# Patient Record
Sex: MALE | Race: ASIAN
[De-identification: names, ages, dates, MRNs, and addresses within clinical notes are randomized per-mention and may not be internally consistent; named-entity substitution may affect disease eponyms.]

---

## 2017-04-24 ENCOUNTER — RECORD ABSTRACTING (OUTPATIENT)
Age: 67
End: 2017-04-24

## 2017-04-24 DIAGNOSIS — Z83.3 FAMILY HISTORY OF DIABETES MELLITUS: ICD-10-CM

## 2017-04-24 DIAGNOSIS — Z86.79 PERSONAL HISTORY OF OTHER DISEASES OF THE CIRCULATORY SYSTEM: ICD-10-CM

## 2017-04-24 DIAGNOSIS — R39.198 OTHER DIFFICULTIES WITH MICTURITION: ICD-10-CM

## 2017-04-24 DIAGNOSIS — Z82.49 FAMILY HISTORY OF ISCHEMIC HEART DISEASE AND OTHER DISEASES OF THE CIRCULATORY SYSTEM: ICD-10-CM

## 2017-04-24 DIAGNOSIS — Z86.39 PERSONAL HISTORY OF OTHER ENDOCRINE, NUTRITIONAL AND METABOLIC DISEASE: ICD-10-CM

## 2017-04-24 PROBLEM — Z00.00 ENCOUNTER FOR PREVENTIVE HEALTH EXAMINATION: Status: ACTIVE | Noted: 2017-04-24

## 2017-06-06 ENCOUNTER — RESULT REVIEW (OUTPATIENT)
Age: 67
End: 2017-06-06

## 2017-06-06 ENCOUNTER — APPOINTMENT (OUTPATIENT)
Dept: UROLOGY | Facility: CLINIC | Age: 67
End: 2017-06-06
Payer: MEDICARE

## 2017-06-06 VITALS — SYSTOLIC BLOOD PRESSURE: 162 MMHG | DIASTOLIC BLOOD PRESSURE: 83 MMHG | HEART RATE: 72 BPM

## 2017-06-06 LAB
BILIRUB UR QL STRIP: NORMAL
CLARITY UR: CLEAR
COLLECTION METHOD: NORMAL
GLUCOSE UR-MCNC: NORMAL
HCG UR QL: NORMAL EU/DL
HGB UR QL STRIP.AUTO: NORMAL
KETONES UR-MCNC: NORMAL
LEUKOCYTE ESTERASE UR QL STRIP: NORMAL
NITRITE UR QL STRIP: NORMAL
PH UR STRIP: 7
PROT UR STRIP-MCNC: NORMAL
SP GR UR STRIP: 1

## 2017-06-06 PROCEDURE — 81003 URINALYSIS AUTO W/O SCOPE: CPT | Mod: QW

## 2017-06-06 PROCEDURE — 99213 OFFICE O/P EST LOW 20 MIN: CPT

## 2017-06-06 RX ORDER — SILODOSIN 8 MG/1
8 CAPSULE ORAL
Refills: 0 | Status: ACTIVE | COMMUNITY

## 2017-06-06 RX ORDER — SILODOSIN 8 MG/1
8 CAPSULE ORAL
Qty: 90 | Refills: 3 | Status: ACTIVE | COMMUNITY
Start: 2017-06-06 | End: 1900-01-01

## 2017-06-20 ENCOUNTER — APPOINTMENT (OUTPATIENT)
Dept: UROLOGY | Facility: CLINIC | Age: 67
End: 2017-06-20
Payer: MEDICARE

## 2017-06-20 ENCOUNTER — OUTPATIENT (OUTPATIENT)
Dept: OUTPATIENT SERVICES | Facility: HOSPITAL | Age: 67
LOS: 1 days | Discharge: HOME | End: 2017-06-20

## 2017-06-20 PROCEDURE — 55700: CPT

## 2017-06-20 PROCEDURE — 76872 US TRANSRECTAL: CPT

## 2017-06-20 PROCEDURE — 76942 ECHO GUIDE FOR BIOPSY: CPT | Mod: 59

## 2017-06-28 DIAGNOSIS — R89.7 ABNORMAL HISTOLOGICAL FINDINGS IN SPECIMENS FROM OTHER ORGANS, SYSTEMS AND TISSUES: ICD-10-CM

## 2017-07-13 ENCOUNTER — APPOINTMENT (OUTPATIENT)
Dept: UROLOGY | Facility: CLINIC | Age: 67
End: 2017-07-13

## 2017-07-13 ENCOUNTER — OTHER (OUTPATIENT)
Age: 67
End: 2017-07-13

## 2017-07-13 VITALS
SYSTOLIC BLOOD PRESSURE: 137 MMHG | BODY MASS INDEX: 23.05 KG/M2 | DIASTOLIC BLOOD PRESSURE: 69 MMHG | HEART RATE: 94 BPM | WEIGHT: 135 LBS | HEIGHT: 64 IN

## 2017-08-24 ENCOUNTER — APPOINTMENT (OUTPATIENT)
Dept: UROLOGY | Facility: CLINIC | Age: 67
End: 2017-08-24
Payer: MEDICARE

## 2017-08-24 VITALS
BODY MASS INDEX: 23.05 KG/M2 | HEIGHT: 64 IN | HEART RATE: 80 BPM | DIASTOLIC BLOOD PRESSURE: 68 MMHG | WEIGHT: 135 LBS | SYSTOLIC BLOOD PRESSURE: 131 MMHG

## 2017-08-24 PROCEDURE — 99214 OFFICE O/P EST MOD 30 MIN: CPT

## 2017-10-26 ENCOUNTER — APPOINTMENT (OUTPATIENT)
Dept: UROLOGY | Facility: CLINIC | Age: 67
End: 2017-10-26
Payer: MEDICARE

## 2017-10-26 VITALS
HEIGHT: 64 IN | SYSTOLIC BLOOD PRESSURE: 141 MMHG | WEIGHT: 140 LBS | DIASTOLIC BLOOD PRESSURE: 80 MMHG | HEART RATE: 89 BPM | BODY MASS INDEX: 23.9 KG/M2

## 2017-10-26 PROCEDURE — 99213 OFFICE O/P EST LOW 20 MIN: CPT

## 2017-10-27 ENCOUNTER — TRANSCRIPTION ENCOUNTER (OUTPATIENT)
Age: 67
End: 2017-10-27

## 2018-01-25 ENCOUNTER — APPOINTMENT (OUTPATIENT)
Dept: UROLOGY | Facility: CLINIC | Age: 68
End: 2018-01-25
Payer: MEDICARE

## 2018-01-25 VITALS
BODY MASS INDEX: 23.9 KG/M2 | WEIGHT: 140 LBS | DIASTOLIC BLOOD PRESSURE: 87 MMHG | SYSTOLIC BLOOD PRESSURE: 157 MMHG | HEIGHT: 64 IN | HEART RATE: 92 BPM

## 2018-01-25 DIAGNOSIS — R97.20 ELEVATED PROSTATE, SPECIFIC ANTIGEN [PSA]: ICD-10-CM

## 2018-01-25 PROCEDURE — 99214 OFFICE O/P EST MOD 30 MIN: CPT | Mod: 25

## 2018-01-25 PROCEDURE — 96402 CHEMO HORMON ANTINEOPL SQ/IM: CPT

## 2018-03-07 ENCOUNTER — APPOINTMENT (OUTPATIENT)
Dept: UROLOGY | Facility: CLINIC | Age: 68
End: 2018-03-07
Payer: MEDICARE

## 2018-03-07 VITALS
HEIGHT: 64 IN | SYSTOLIC BLOOD PRESSURE: 143 MMHG | WEIGHT: 140 LBS | DIASTOLIC BLOOD PRESSURE: 74 MMHG | BODY MASS INDEX: 23.9 KG/M2 | HEART RATE: 88 BPM

## 2018-03-07 PROCEDURE — 99213 OFFICE O/P EST LOW 20 MIN: CPT

## 2018-03-20 ENCOUNTER — MESSAGE (OUTPATIENT)
Age: 68
End: 2018-03-20

## 2018-03-27 ENCOUNTER — MESSAGE (OUTPATIENT)
Age: 68
End: 2018-03-27

## 2018-05-15 ENCOUNTER — APPOINTMENT (OUTPATIENT)
Dept: UROLOGY | Facility: CLINIC | Age: 68
End: 2018-05-15
Payer: MEDICARE

## 2018-05-15 PROCEDURE — 76872 US TRANSRECTAL: CPT

## 2018-06-12 ENCOUNTER — APPOINTMENT (OUTPATIENT)
Dept: UROLOGY | Facility: CLINIC | Age: 68
End: 2018-06-12
Payer: MEDICARE

## 2018-06-12 VITALS
BODY MASS INDEX: 22.88 KG/M2 | HEIGHT: 64 IN | WEIGHT: 134 LBS | HEART RATE: 81 BPM | SYSTOLIC BLOOD PRESSURE: 120 MMHG | DIASTOLIC BLOOD PRESSURE: 65 MMHG

## 2018-06-12 PROCEDURE — 99214 OFFICE O/P EST MOD 30 MIN: CPT

## 2018-06-12 RX ORDER — AMLODIPINE BESYLATE 2.5 MG/1
2.5 TABLET ORAL
Qty: 90 | Refills: 0 | Status: ACTIVE | COMMUNITY
Start: 2018-02-26

## 2018-08-14 ENCOUNTER — RX RENEWAL (OUTPATIENT)
Age: 68
End: 2018-08-14

## 2018-08-14 RX ORDER — SILODOSIN 8 MG/1
8 CAPSULE ORAL
Qty: 90 | Refills: 3 | Status: ACTIVE | COMMUNITY
Start: 2018-08-14 | End: 1900-01-01

## 2019-03-13 ENCOUNTER — APPOINTMENT (OUTPATIENT)
Dept: UROLOGY | Facility: CLINIC | Age: 69
End: 2019-03-13
Payer: MEDICARE

## 2019-03-13 PROCEDURE — 99213 OFFICE O/P EST LOW 20 MIN: CPT

## 2019-03-13 RX ORDER — CHLORHEXIDINE GLUCONATE, 0.12% ORAL RINSE 1.2 MG/ML
0.12 SOLUTION DENTAL
Qty: 946 | Refills: 0 | Status: ACTIVE | COMMUNITY
Start: 2019-02-14

## 2019-06-12 ENCOUNTER — TRANSCRIPTION ENCOUNTER (OUTPATIENT)
Age: 69
End: 2019-06-12

## 2019-06-13 ENCOUNTER — APPOINTMENT (OUTPATIENT)
Dept: UROLOGY | Facility: CLINIC | Age: 69
End: 2019-06-13
Payer: MEDICARE

## 2019-06-13 VITALS
HEIGHT: 64 IN | DIASTOLIC BLOOD PRESSURE: 72 MMHG | WEIGHT: 135 LBS | BODY MASS INDEX: 23.05 KG/M2 | SYSTOLIC BLOOD PRESSURE: 143 MMHG

## 2019-06-13 PROCEDURE — 99213 OFFICE O/P EST LOW 20 MIN: CPT

## 2019-08-08 ENCOUNTER — RX RENEWAL (OUTPATIENT)
Age: 69
End: 2019-08-08

## 2019-08-08 RX ORDER — SILODOSIN 8 MG/1
8 CAPSULE ORAL
Qty: 90 | Refills: 3 | Status: ACTIVE | COMMUNITY
Start: 2019-08-08 | End: 1900-01-01

## 2019-10-17 ENCOUNTER — APPOINTMENT (OUTPATIENT)
Dept: UROLOGY | Facility: CLINIC | Age: 69
End: 2019-10-17
Payer: MEDICARE

## 2019-10-17 VITALS
HEART RATE: 78 BPM | WEIGHT: 135 LBS | DIASTOLIC BLOOD PRESSURE: 61 MMHG | BODY MASS INDEX: 23.05 KG/M2 | HEIGHT: 64 IN | SYSTOLIC BLOOD PRESSURE: 135 MMHG

## 2019-10-17 PROCEDURE — 99213 OFFICE O/P EST LOW 20 MIN: CPT

## 2019-10-17 NOTE — HISTORY OF PRESENT ILLNESS
[FreeTextEntry1] : 68-year-old male with history of prostate carcinoma, stage T1c , group 1, rerclassified with multiple sites following prostate biopsy on active surveillance program.\par Patient is post proton therapy in September 2018 undergoing 44 treatments in 2 months. He denies any significant voiding symptoms .\par 11/2018 PSA=2.4\par 3/2019   PSA=2.0\par 6/2019  PSA = 1.6\par 10/2019 psa = 1.8\par On rapaflow Q OD [Urinary Incontinence] : no urinary incontinence [Urinary Retention] : no urinary retention [Urinary Frequency] : urinary frequency [Urinary Urgency] : no urinary urgency [Straining] : no straining [Nocturia] : nocturia [Weak Stream] : no weak stream [Dysuria] : no dysuria [Hematuria - Gross] : no gross hematuria [Fatigue] : no fatigue [Anorexia] : no anorexia [None] : None

## 2019-10-17 NOTE — ASSESSMENT
[FreeTextEntry1] : #1. Prostate carcinoma, stage T1c,  group 1--reclassification\par #2. Radioresistant prostate CA  --psa curt not yet achieved\par \par \par Plan\par PSA q.3 months\par Return 3 months\par continue rapaflo  every other day

## 2019-10-17 NOTE — PHYSICAL EXAM
[General Appearance - Well Developed] : well developed [General Appearance - Well Nourished] : well nourished [Normal Appearance] : normal appearance [Well Groomed] : well groomed [Abdomen Soft] : soft [Abdomen Tenderness] : non-tender [Costovertebral Angle Tenderness] : no ~M costovertebral angle tenderness [Skin Color & Pigmentation] : normal skin color and pigmentation [Skin Lesions] : no skin lesions [Edema] : no peripheral edema [] : no respiratory distress [Respiration, Rhythm And Depth] : normal respiratory rhythm and effort [Exaggerated Use Of Accessory Muscles For Inspiration] : no accessory muscle use [Affect] : the affect was normal [Oriented To Time, Place, And Person] : oriented to person, place, and time [Not Anxious] : not anxious [Mood] : the mood was normal [No Focal Deficits] : no focal deficits [Normal Station and Gait] : the gait and station were normal for the patient's age [Motor Exam] : the motor exam was normal [No Palpable Adenopathy] : no palpable adenopathy [FreeTextEntry1] : flat

## 2020-01-21 ENCOUNTER — APPOINTMENT (OUTPATIENT)
Dept: UROLOGY | Facility: CLINIC | Age: 70
End: 2020-01-21
Payer: MEDICARE

## 2020-01-21 PROCEDURE — 99213 OFFICE O/P EST LOW 20 MIN: CPT

## 2020-01-21 RX ORDER — AMLODIPINE BESYLATE 5 MG/1
5 TABLET ORAL
Refills: 0 | Status: DISCONTINUED | COMMUNITY
End: 2020-01-21

## 2020-09-02 ENCOUNTER — APPOINTMENT (OUTPATIENT)
Dept: UROLOGY | Facility: CLINIC | Age: 70
End: 2020-09-02
Payer: MEDICARE

## 2020-09-02 VITALS
TEMPERATURE: 97.5 F | HEIGHT: 64 IN | DIASTOLIC BLOOD PRESSURE: 67 MMHG | SYSTOLIC BLOOD PRESSURE: 117 MMHG | WEIGHT: 135 LBS | BODY MASS INDEX: 23.05 KG/M2

## 2020-09-02 PROCEDURE — 99213 OFFICE O/P EST LOW 20 MIN: CPT

## 2021-04-07 ENCOUNTER — APPOINTMENT (OUTPATIENT)
Dept: UROLOGY | Facility: CLINIC | Age: 71
End: 2021-04-07
Payer: MEDICARE

## 2021-04-07 VITALS
HEART RATE: 84 BPM | DIASTOLIC BLOOD PRESSURE: 72 MMHG | HEIGHT: 64 IN | WEIGHT: 136 LBS | SYSTOLIC BLOOD PRESSURE: 135 MMHG | BODY MASS INDEX: 23.22 KG/M2 | TEMPERATURE: 98.2 F

## 2021-04-07 PROCEDURE — 99213 OFFICE O/P EST LOW 20 MIN: CPT

## 2021-09-29 ENCOUNTER — APPOINTMENT (OUTPATIENT)
Dept: UROLOGY | Facility: CLINIC | Age: 71
End: 2021-09-29

## 2021-11-02 ENCOUNTER — APPOINTMENT (OUTPATIENT)
Dept: UROLOGY | Facility: CLINIC | Age: 71
End: 2021-11-02
Payer: MEDICARE

## 2021-11-02 VITALS — HEIGHT: 64 IN | TEMPERATURE: 98 F | BODY MASS INDEX: 22.2 KG/M2 | WEIGHT: 130 LBS

## 2021-11-02 PROCEDURE — 99213 OFFICE O/P EST LOW 20 MIN: CPT

## 2021-11-02 NOTE — PHYSICAL EXAM
[General Appearance - Well Developed] : well developed [General Appearance - Well Nourished] : well nourished [Normal Appearance] : normal appearance [Well Groomed] : well groomed [Abdomen Soft] : soft [Abdomen Tenderness] : non-tender [Costovertebral Angle Tenderness] : no ~M costovertebral angle tenderness [Skin Color & Pigmentation] : normal skin color and pigmentation [Skin Lesions] : no skin lesions [Edema] : no peripheral edema [] : no respiratory distress [Respiration, Rhythm And Depth] : normal respiratory rhythm and effort [Exaggerated Use Of Accessory Muscles For Inspiration] : no accessory muscle use [Oriented To Time, Place, And Person] : oriented to person, place, and time [Affect] : the affect was normal [Mood] : the mood was normal [Not Anxious] : not anxious [Normal Station and Gait] : the gait and station were normal for the patient's age [No Focal Deficits] : no focal deficits [Motor Exam] : the motor exam was normal [No Palpable Adenopathy] : no palpable adenopathy [FreeTextEntry1] : flat

## 2021-11-02 NOTE — HISTORY OF PRESENT ILLNESS
[Urinary Frequency] : urinary frequency [Nocturia] : nocturia [None] : None [FreeTextEntry1] : 70 -year-old male with history of prostate carcinoma, stage T1c , group 1, rerclassified with multiple sites following prostate biopsy on active surveillance program.\par Patient is post proton therapy in September 2018 undergoing 44 treatments in 2 months. He denies any significant voiding symptoms . \par Pt reports voiding well, good appetite, and good activity. Denies gross hematuria and produces clear urine.\par \par All Past and Present Data Reviewed:\par 11/2018 PSA=2.4\par 3/2019   PSA=2.0\par 6/2019  PSA = 1.6\par 10/2019 PSA = 1.8\par 1/2020 PSA  =  1.3\par 8/2020 PSA  = 0.9\par 3/2021 PSA  = 0.9\par 11/2021 psa = 0.7 [Urinary Incontinence] : no urinary incontinence [Urinary Retention] : no urinary retention [Urinary Urgency] : no urinary urgency [Straining] : no straining [Weak Stream] : no weak stream [Dysuria] : no dysuria [Hematuria - Gross] : no gross hematuria [Anorexia] : no anorexia

## 2021-11-02 NOTE — ASSESSMENT
[FreeTextEntry1] : #1. Prostate carcinoma, stage T1c,  GG  1--reclassification\par #2. Slow   response   - s/p Proton Tx .\par \par \par Plan:\par PSA 6  months  --- as per pt\par Return 6  months\par

## 2022-06-14 ENCOUNTER — APPOINTMENT (OUTPATIENT)
Dept: UROLOGY | Facility: CLINIC | Age: 72
End: 2022-06-14
Payer: MEDICARE

## 2022-06-14 DIAGNOSIS — R39.15 URGENCY OF URINATION: ICD-10-CM

## 2022-06-14 PROCEDURE — 99213 OFFICE O/P EST LOW 20 MIN: CPT

## 2022-06-14 NOTE — HISTORY OF PRESENT ILLNESS
[Urinary Frequency] : urinary frequency [Nocturia] : nocturia [None] : None [FreeTextEntry1] : 71  -year-old male with history of prostate carcinoma, stage T1c , group 1, reclassified with multiple sites following prostate biopsy on active surveillance program.\par Patient is post proton therapy in September 2018 undergoing 44 treatments in 2 months. \par Pt reports new urgency , good appetite, and good activity. Denies gross hematuria and produces clear urine.\par \par All Past and Present Data Reviewed:\par 11/2018 PSA=2.4\par 3/2019   PSA=2.0\par 6/2019  PSA = 1.6\par 10/2019 PSA = 1.8\par 1/2020 PSA  =  1.3\par 8/2020 PSA  = 0.9\par 3/2021 PSA  = 0.9\par 11/2021 psa = 0.7\par 6/2022  psa = 0.9 [Urinary Urgency] : urinary urgency [Straining] : no straining [Weak Stream] : no weak stream [Dysuria] : no dysuria [Hematuria - Gross] : no gross hematuria

## 2022-06-14 NOTE — ASSESSMENT
[FreeTextEntry1] : #1. Prostate carcinoma, stage T1c,  GG  1--reclassification\par #2. PSA bounce   - s/p Proton Tx .\par #3. LUTS -- urgency -- \par \par Plan:\par PSA 6  months  --- as per pt\par Return 6  months\par Discussed psa bounce  \par

## 2022-12-06 ENCOUNTER — APPOINTMENT (OUTPATIENT)
Dept: UROLOGY | Facility: CLINIC | Age: 72
End: 2022-12-06

## 2022-12-07 ENCOUNTER — APPOINTMENT (OUTPATIENT)
Dept: UROLOGY | Facility: CLINIC | Age: 72
End: 2022-12-07

## 2022-12-07 VITALS
WEIGHT: 130 LBS | OXYGEN SATURATION: 98 % | HEIGHT: 64 IN | BODY MASS INDEX: 22.2 KG/M2 | RESPIRATION RATE: 18 BRPM | HEART RATE: 75 BPM | SYSTOLIC BLOOD PRESSURE: 138 MMHG | TEMPERATURE: 97.5 F | DIASTOLIC BLOOD PRESSURE: 64 MMHG

## 2022-12-07 DIAGNOSIS — R35.0 FREQUENCY OF MICTURITION: ICD-10-CM

## 2022-12-07 PROCEDURE — 99213 OFFICE O/P EST LOW 20 MIN: CPT

## 2024-06-12 ENCOUNTER — APPOINTMENT (OUTPATIENT)
Dept: UROLOGY | Facility: CLINIC | Age: 74
End: 2024-06-12
Payer: MEDICARE

## 2024-06-12 VITALS
HEIGHT: 64 IN | DIASTOLIC BLOOD PRESSURE: 75 MMHG | BODY MASS INDEX: 22.2 KG/M2 | TEMPERATURE: 99 F | WEIGHT: 130 LBS | HEART RATE: 77 BPM | SYSTOLIC BLOOD PRESSURE: 162 MMHG | OXYGEN SATURATION: 97 %

## 2024-06-12 DIAGNOSIS — C61 MALIGNANT NEOPLASM OF PROSTATE: ICD-10-CM

## 2024-06-12 PROCEDURE — 99213 OFFICE O/P EST LOW 20 MIN: CPT

## 2024-06-12 PROCEDURE — G2211 COMPLEX E/M VISIT ADD ON: CPT

## 2024-10-18 ENCOUNTER — APPOINTMENT (OUTPATIENT)
Dept: VASCULAR SURGERY | Facility: CLINIC | Age: 74
End: 2024-10-18
Payer: MEDICARE

## 2024-10-18 VITALS
SYSTOLIC BLOOD PRESSURE: 169 MMHG | BODY MASS INDEX: 23.9 KG/M2 | DIASTOLIC BLOOD PRESSURE: 71 MMHG | HEART RATE: 83 BPM | HEIGHT: 64 IN | WEIGHT: 140 LBS

## 2024-10-18 DIAGNOSIS — I10 ESSENTIAL (PRIMARY) HYPERTENSION: ICD-10-CM

## 2024-10-18 DIAGNOSIS — E78.00 PURE HYPERCHOLESTEROLEMIA, UNSPECIFIED: ICD-10-CM

## 2024-10-18 DIAGNOSIS — I65.23 OCCLUSION AND STENOSIS OF BILATERAL CAROTID ARTERIES: ICD-10-CM

## 2024-10-18 PROCEDURE — 99203 OFFICE O/P NEW LOW 30 MIN: CPT

## 2024-10-18 RX ORDER — ATORVASTATIN CALCIUM 80 MG/1
TABLET, FILM COATED ORAL
Refills: 0 | Status: ACTIVE | COMMUNITY

## 2024-10-18 RX ORDER — LOSARTAN POTASSIUM AND HYDROCHLOROTHIAZIDE 100; 12.5 MG/1; MG/1
TABLET, FILM COATED ORAL
Refills: 0 | Status: ACTIVE | COMMUNITY

## 2025-04-26 ENCOUNTER — INPATIENT (INPATIENT)
Facility: HOSPITAL | Age: 75
LOS: 4 days | Discharge: HOME | DRG: 322 | End: 2025-05-01
Attending: STUDENT IN AN ORGANIZED HEALTH CARE EDUCATION/TRAINING PROGRAM | Admitting: STUDENT IN AN ORGANIZED HEALTH CARE EDUCATION/TRAINING PROGRAM
Payer: MEDICARE

## 2025-04-26 VITALS
HEART RATE: 92 BPM | OXYGEN SATURATION: 98 % | WEIGHT: 139.99 LBS | DIASTOLIC BLOOD PRESSURE: 74 MMHG | TEMPERATURE: 98 F | SYSTOLIC BLOOD PRESSURE: 146 MMHG | RESPIRATION RATE: 18 BRPM

## 2025-04-26 DIAGNOSIS — R07.9 CHEST PAIN, UNSPECIFIED: ICD-10-CM

## 2025-04-26 LAB
ALBUMIN SERPL ELPH-MCNC: 4.2 G/DL — SIGNIFICANT CHANGE UP (ref 3.5–5.2)
ALP SERPL-CCNC: 37 U/L — SIGNIFICANT CHANGE UP (ref 30–115)
ALT FLD-CCNC: 17 U/L — SIGNIFICANT CHANGE UP (ref 0–41)
ANION GAP SERPL CALC-SCNC: 12 MMOL/L — SIGNIFICANT CHANGE UP (ref 7–14)
ANION GAP SERPL CALC-SCNC: 13 MMOL/L — SIGNIFICANT CHANGE UP (ref 7–14)
APPEARANCE UR: CLEAR — SIGNIFICANT CHANGE UP
AST SERPL-CCNC: 29 U/L — SIGNIFICANT CHANGE UP (ref 0–41)
BASOPHILS # BLD AUTO: 0.02 K/UL — SIGNIFICANT CHANGE UP (ref 0–0.2)
BASOPHILS NFR BLD AUTO: 0.5 % — SIGNIFICANT CHANGE UP (ref 0–1)
BILIRUB SERPL-MCNC: 0.7 MG/DL — SIGNIFICANT CHANGE UP (ref 0.2–1.2)
BILIRUB UR-MCNC: NEGATIVE — SIGNIFICANT CHANGE UP
BUN SERPL-MCNC: 11 MG/DL — SIGNIFICANT CHANGE UP (ref 10–20)
BUN SERPL-MCNC: 14 MG/DL — SIGNIFICANT CHANGE UP (ref 10–20)
CALCIUM SERPL-MCNC: 8.5 MG/DL — SIGNIFICANT CHANGE UP (ref 8.4–10.5)
CALCIUM SERPL-MCNC: 9 MG/DL — SIGNIFICANT CHANGE UP (ref 8.4–10.5)
CHLORIDE SERPL-SCNC: 96 MMOL/L — LOW (ref 98–110)
CHLORIDE SERPL-SCNC: 98 MMOL/L — SIGNIFICANT CHANGE UP (ref 98–110)
CO2 SERPL-SCNC: 19 MMOL/L — SIGNIFICANT CHANGE UP (ref 17–32)
CO2 SERPL-SCNC: 20 MMOL/L — SIGNIFICANT CHANGE UP (ref 17–32)
COLOR SPEC: YELLOW — SIGNIFICANT CHANGE UP
CREAT SERPL-MCNC: 0.9 MG/DL — SIGNIFICANT CHANGE UP (ref 0.7–1.5)
CREAT SERPL-MCNC: 1 MG/DL — SIGNIFICANT CHANGE UP (ref 0.7–1.5)
DIFF PNL FLD: NEGATIVE — SIGNIFICANT CHANGE UP
EGFR: 78 ML/MIN/1.73M2 — SIGNIFICANT CHANGE UP
EGFR: 78 ML/MIN/1.73M2 — SIGNIFICANT CHANGE UP
EGFR: 89 ML/MIN/1.73M2 — SIGNIFICANT CHANGE UP
EGFR: 89 ML/MIN/1.73M2 — SIGNIFICANT CHANGE UP
EOSINOPHIL # BLD AUTO: 0.04 K/UL — SIGNIFICANT CHANGE UP (ref 0–0.7)
EOSINOPHIL NFR BLD AUTO: 0.9 % — SIGNIFICANT CHANGE UP (ref 0–8)
GLUCOSE SERPL-MCNC: 102 MG/DL — HIGH (ref 70–99)
GLUCOSE SERPL-MCNC: 103 MG/DL — HIGH (ref 70–99)
GLUCOSE UR QL: NEGATIVE MG/DL — SIGNIFICANT CHANGE UP
HCT VFR BLD CALC: 39 % — LOW (ref 42–52)
HGB BLD-MCNC: 13.9 G/DL — LOW (ref 14–18)
IMM GRANULOCYTES NFR BLD AUTO: 0.2 % — SIGNIFICANT CHANGE UP (ref 0.1–0.3)
KETONES UR-MCNC: 15 MG/DL
LEUKOCYTE ESTERASE UR-ACNC: NEGATIVE — SIGNIFICANT CHANGE UP
LYMPHOCYTES # BLD AUTO: 0.72 K/UL — LOW (ref 1.2–3.4)
LYMPHOCYTES # BLD AUTO: 16.4 % — LOW (ref 20.5–51.1)
MCHC RBC-ENTMCNC: 31.7 PG — HIGH (ref 27–31)
MCHC RBC-ENTMCNC: 35.6 G/DL — SIGNIFICANT CHANGE UP (ref 32–37)
MCV RBC AUTO: 89 FL — SIGNIFICANT CHANGE UP (ref 80–94)
MONOCYTES # BLD AUTO: 0.39 K/UL — SIGNIFICANT CHANGE UP (ref 0.1–0.6)
MONOCYTES NFR BLD AUTO: 8.9 % — SIGNIFICANT CHANGE UP (ref 1.7–9.3)
NEUTROPHILS # BLD AUTO: 3.22 K/UL — SIGNIFICANT CHANGE UP (ref 1.4–6.5)
NEUTROPHILS NFR BLD AUTO: 73.1 % — SIGNIFICANT CHANGE UP (ref 42.2–75.2)
NITRITE UR-MCNC: NEGATIVE — SIGNIFICANT CHANGE UP
NRBC BLD AUTO-RTO: 0 /100 WBCS — SIGNIFICANT CHANGE UP (ref 0–0)
PH UR: 7 — SIGNIFICANT CHANGE UP (ref 5–8)
PLATELET # BLD AUTO: 188 K/UL — SIGNIFICANT CHANGE UP (ref 130–400)
PMV BLD: 9.4 FL — SIGNIFICANT CHANGE UP (ref 7.4–10.4)
POTASSIUM SERPL-MCNC: 3.9 MMOL/L — SIGNIFICANT CHANGE UP (ref 3.5–5)
POTASSIUM SERPL-MCNC: 4.2 MMOL/L — SIGNIFICANT CHANGE UP (ref 3.5–5)
POTASSIUM SERPL-SCNC: 3.9 MMOL/L — SIGNIFICANT CHANGE UP (ref 3.5–5)
POTASSIUM SERPL-SCNC: 4.2 MMOL/L — SIGNIFICANT CHANGE UP (ref 3.5–5)
PROT SERPL-MCNC: 6.4 G/DL — SIGNIFICANT CHANGE UP (ref 6–8)
PROT UR-MCNC: NEGATIVE MG/DL — SIGNIFICANT CHANGE UP
RBC # BLD: 4.38 M/UL — LOW (ref 4.7–6.1)
RBC # FLD: 12 % — SIGNIFICANT CHANGE UP (ref 11.5–14.5)
SODIUM SERPL-SCNC: 128 MMOL/L — LOW (ref 135–146)
SODIUM SERPL-SCNC: 130 MMOL/L — LOW (ref 135–146)
SP GR SPEC: 1.01 — SIGNIFICANT CHANGE UP (ref 1–1.03)
TROPONIN T, HIGH SENSITIVITY RESULT: 11 NG/L — SIGNIFICANT CHANGE UP (ref 6–21)
TROPONIN T, HIGH SENSITIVITY RESULT: 12 NG/L — SIGNIFICANT CHANGE UP (ref 6–21)
UROBILINOGEN FLD QL: 0.2 MG/DL — SIGNIFICANT CHANGE UP (ref 0.2–1)
WBC # BLD: 4.4 K/UL — LOW (ref 4.8–10.8)
WBC # FLD AUTO: 4.4 K/UL — LOW (ref 4.8–10.8)

## 2025-04-26 PROCEDURE — 83735 ASSAY OF MAGNESIUM: CPT

## 2025-04-26 PROCEDURE — 80061 LIPID PANEL: CPT

## 2025-04-26 PROCEDURE — C1753: CPT

## 2025-04-26 PROCEDURE — 93018 CV STRESS TEST I&R ONLY: CPT

## 2025-04-26 PROCEDURE — C1874: CPT

## 2025-04-26 PROCEDURE — 85027 COMPLETE CBC AUTOMATED: CPT

## 2025-04-26 PROCEDURE — 84443 ASSAY THYROID STIM HORMONE: CPT

## 2025-04-26 PROCEDURE — 93016 CV STRESS TEST SUPVJ ONLY: CPT

## 2025-04-26 PROCEDURE — 93005 ELECTROCARDIOGRAM TRACING: CPT

## 2025-04-26 PROCEDURE — 71045 X-RAY EXAM CHEST 1 VIEW: CPT | Mod: 26

## 2025-04-26 PROCEDURE — 80048 BASIC METABOLIC PNL TOTAL CA: CPT

## 2025-04-26 PROCEDURE — 78452 HT MUSCLE IMAGE SPECT MULT: CPT | Mod: 26

## 2025-04-26 PROCEDURE — C9600: CPT | Mod: LD

## 2025-04-26 PROCEDURE — 93307 TTE W/O DOPPLER COMPLETE: CPT

## 2025-04-26 PROCEDURE — C1894: CPT

## 2025-04-26 PROCEDURE — C1769: CPT

## 2025-04-26 PROCEDURE — C1887: CPT

## 2025-04-26 PROCEDURE — 99223 1ST HOSP IP/OBS HIGH 75: CPT | Mod: FS

## 2025-04-26 PROCEDURE — C1725: CPT

## 2025-04-26 PROCEDURE — 92978 ENDOLUMINL IVUS OCT C 1ST: CPT | Mod: LC

## 2025-04-26 PROCEDURE — 83036 HEMOGLOBIN GLYCOSYLATED A1C: CPT

## 2025-04-26 PROCEDURE — 93458 L HRT ARTERY/VENTRICLE ANGIO: CPT | Mod: 59

## 2025-04-26 PROCEDURE — 36415 COLL VENOUS BLD VENIPUNCTURE: CPT

## 2025-04-26 RX ORDER — ATORVASTATIN CALCIUM 80 MG/1
40 TABLET, FILM COATED ORAL AT BEDTIME
Refills: 0 | Status: DISCONTINUED | OUTPATIENT
Start: 2025-04-26 | End: 2025-04-26

## 2025-04-26 RX ORDER — LOSARTAN POTASSIUM 100 MG/1
50 TABLET, FILM COATED ORAL DAILY
Refills: 0 | Status: DISCONTINUED | OUTPATIENT
Start: 2025-04-27 | End: 2025-05-01

## 2025-04-26 RX ORDER — MELATONIN 5 MG
5 TABLET ORAL AT BEDTIME
Refills: 0 | Status: DISCONTINUED | OUTPATIENT
Start: 2025-04-26 | End: 2025-04-28

## 2025-04-26 RX ORDER — LOSARTAN POTASSIUM 100 MG/1
25 TABLET, FILM COATED ORAL DAILY
Refills: 0 | Status: DISCONTINUED | OUTPATIENT
Start: 2025-04-26 | End: 2025-04-26

## 2025-04-26 RX ORDER — ASPIRIN 325 MG
324 TABLET ORAL ONCE
Refills: 0 | Status: COMPLETED | OUTPATIENT
Start: 2025-04-26 | End: 2025-04-26

## 2025-04-26 RX ORDER — ATORVASTATIN CALCIUM 80 MG/1
10 TABLET, FILM COATED ORAL AT BEDTIME
Refills: 0 | Status: DISCONTINUED | OUTPATIENT
Start: 2025-04-26 | End: 2025-04-27

## 2025-04-26 RX ORDER — REGADENOSON 0.08 MG/ML
0.4 INJECTION, SOLUTION INTRAVENOUS ONCE
Refills: 0 | Status: DISCONTINUED | OUTPATIENT
Start: 2025-04-26 | End: 2025-04-29

## 2025-04-26 RX ORDER — LOSARTAN POTASSIUM 100 MG/1
50 TABLET, FILM COATED ORAL DAILY
Refills: 0 | Status: DISCONTINUED | OUTPATIENT
Start: 2025-04-26 | End: 2025-04-26

## 2025-04-26 RX ORDER — ICOSAPENT ETHYL 500 MG/1
2 CAPSULE ORAL
Refills: 0 | DISCHARGE

## 2025-04-26 RX ORDER — HEPARIN SODIUM 1000 [USP'U]/ML
5000 INJECTION INTRAVENOUS; SUBCUTANEOUS EVERY 12 HOURS
Refills: 0 | Status: DISCONTINUED | OUTPATIENT
Start: 2025-04-26 | End: 2025-04-27

## 2025-04-26 RX ORDER — AMLODIPINE BESYLATE 10 MG/1
2.5 TABLET ORAL ONCE
Refills: 0 | Status: COMPLETED | OUTPATIENT
Start: 2025-04-26 | End: 2025-04-26

## 2025-04-26 RX ORDER — AMLODIPINE BESYLATE 10 MG/1
5 TABLET ORAL DAILY
Refills: 0 | Status: DISCONTINUED | OUTPATIENT
Start: 2025-04-27 | End: 2025-05-01

## 2025-04-26 RX ORDER — ASPIRIN 325 MG
81 TABLET ORAL DAILY
Refills: 0 | Status: DISCONTINUED | OUTPATIENT
Start: 2025-04-27 | End: 2025-05-01

## 2025-04-26 RX ORDER — AMLODIPINE BESYLATE 10 MG/1
2.5 TABLET ORAL DAILY
Refills: 0 | Status: DISCONTINUED | OUTPATIENT
Start: 2025-04-26 | End: 2025-04-26

## 2025-04-26 RX ADMIN — HEPARIN SODIUM 5000 UNIT(S): 1000 INJECTION INTRAVENOUS; SUBCUTANEOUS at 17:32

## 2025-04-26 RX ADMIN — Medication 1000 MILLILITER(S): at 05:43

## 2025-04-26 RX ADMIN — AMLODIPINE BESYLATE 2.5 MILLIGRAM(S): 10 TABLET ORAL at 08:53

## 2025-04-26 RX ADMIN — Medication 324 MILLIGRAM(S): at 17:32

## 2025-04-26 RX ADMIN — LOSARTAN POTASSIUM 50 MILLIGRAM(S): 100 TABLET, FILM COATED ORAL at 08:53

## 2025-04-26 RX ADMIN — Medication 5 MILLIGRAM(S): at 21:54

## 2025-04-26 RX ADMIN — ATORVASTATIN CALCIUM 10 MILLIGRAM(S): 80 TABLET, FILM COATED ORAL at 21:54

## 2025-04-26 RX ADMIN — AMLODIPINE BESYLATE 2.5 MILLIGRAM(S): 10 TABLET ORAL at 17:31

## 2025-04-26 NOTE — H&P ADULT - NSICDXPASTMEDICALHX_GEN_ALL_CORE_FT
PAST MEDICAL HISTORY:  HLD (hyperlipidemia)     HTN (hypertension)     Pre-diabetes     Prostate CA

## 2025-04-26 NOTE — H&P ADULT - NSICDXFAMILYHX_GEN_ALL_CORE_FT
FAMILY HISTORY:  Mother  Still living? Unknown  Family history of CVA, Age at diagnosis: Age Unknown  FH: myocardial infarction, Age at diagnosis: 71-80

## 2025-04-26 NOTE — ED PROVIDER NOTE - CLINICAL SUMMARY MEDICAL DECISION MAKING FREE TEXT BOX
Pt presented with chest pain/headache.   Labs reviewed  Patient will be placed into obs for further care.    EKG interpretation:  NSR, ischemic changes as interpreted by Bertrand Claire DO    Independent interpretation of the Xray film(s) performed by: Bertrand Claire.     Interpretation: Chest XR negative - no acute infiltrate, no pneumothorax

## 2025-04-26 NOTE — ED PROVIDER NOTE - PROGRESS NOTE DETAILS
ARASH (preattending note)     74yoM PMHx HTN, HLD, with 10 days of difficulty sleeping, 2 days waking up with headache and chest pressure. No current chest pain, no prior cardiac workup, doesn't have a cardiologist.  Discussion via Language Line  Service #[id number]      +systolic murmur on exam, RRR, no lower extremity swelling,     EKG: NSR at 78bpm, nonspecific ischemic changes, no prior for comparison    Low suspicion for ACS, given abnormal EKG and lack of prior cardiac testing,  plan for labs and obs vs admission for further cardiac testing

## 2025-04-26 NOTE — H&P ADULT - NSHPLABSRESULTS_GEN_ALL_CORE
ECG 25: NSR @ 78 bpm    Labs:                          13.9   4.40  )-----------( 188      ( 2025 04:39 )             39.0       Auto Immature Granulocyte %: 0.2 % (25 @ 04:39)        130[L]  |  98  |  11  ----------------------------<  103[H]  3.9   |  19  |  0.9      Calcium: 8.5 mg/dL (25 @ 08:22)      LFTs:             6.4  | 0.7  | 29       ------------------[37      ( 2025 04:39 )  4.2  | x    | 17              Urinalysis Basic - ( 2025 09:01 )    Color: Yellow / Appearance: Clear / S.007 / pH: x  Gluc: x / Ketone: 15 mg/dL  / Bili: Negative / Urobili: 0.2 mg/dL   Blood: x / Protein: Negative mg/dL / Nitrite: Negative   Leuk Esterase: Negative / RBC: x / WBC x   Sq Epi: x / Non Sq Epi: x / Bacteria: x    Urinalysis with Rflx Culture (collected 2025 09:01)

## 2025-04-26 NOTE — H&P ADULT - HISTORY OF PRESENT ILLNESS
73 y/o Mandarin speaking M, but able to communicate in English, with PMHx of HTN, HLD, pre-diabetes, h/o prostate Ca -> s/p radiation 2018, who presented to ED with inability to sleep few days, and the past couple of days c/o chest pressure at night, stating that "insomnia causing my chest pain."  Pt took Ambien without much effect.   Denies any SOB, dizziness, n/v, diaphoresis, orthopnea, PND, LE edema, palpitations, syncope.  In ED, troponin negative x 2.  Pt underwent a NST on 4/26/25 which revealed moderate-to-large size reversible defect in the inferolateral wall of the left ventricle consistent with ischemia, EF 70%. Pt is being admitted to [facility] for further management.

## 2025-04-26 NOTE — ED PROVIDER NOTE - OBJECTIVE STATEMENT
74 yold male to Ed mandarin speaking via interpretor Pmhx Htn, Hld,  c/o insomnia x 10 days; initally attempted otc meds without relief; then attempted ambien 10mg and slept <1 hr; pt currently on losartan, amlodipine and atorvastatin x 6 months - no new meds started; eating and drinking without issues; denies drinking excessive amount of water;  pt now c/o headache and chest pain described as pressure x 2 days; pt denies prior cardiac workup - denies sob, diaphoresis, radiation of pain;

## 2025-04-26 NOTE — PATIENT PROFILE ADULT - HISTORY OF COVID-19 VACCINATION
----- Message from Andre Mcdonald sent at 6/18/2020 10:44 AM CDT -----  Regarding: reschedule  Type:  Needs Medical Advice    Who Called: self  Reason:Patient needs to reschedule missed appointment from this morning for asap  Would the patient rather a call back or a response via [employer]? callback  Best Call Back Number: [phone]  Additional Information: none    
Called spoke to pt, she had an appointment this morning but over slept an missed her appointment. Wanted to reschedule it.  
Vaccine status unknown

## 2025-04-26 NOTE — PATIENT PROFILE ADULT - LANGUAGE ASSISTANCE NEEDED
patient able to understand and answer questions in english/No-Patient/Caregiver offered and refused free interpretation services.

## 2025-04-26 NOTE — H&P ADULT - ASSESSMENT
73 y/o Mandarin speaking M, but able to communicate in English, with PMHx of HTN, HLD, pre-diabetes, h/o prostate Ca -> s/p radiation 2018, who presented to ED with inability to sleep few days, and the past couple of days c/ochest pressure at night, trops negative x 2, with subsequent abnormal NST which revealing moderate-to-large size reversible inferolateral wall ischemia. Pt is being admitted to [facility] for further management.    #Unstable angina  -trops negative x 2  - NST with inferolateral wall ischemia  -order TTE  -ASA 325mg po x 1, then 81mg po daily  -continue home Amlodipine 2.5mg   -continue home Losartan 25mg  -continue home atorvastatin 10mg  -check lipid profile, A1C, TFTs  -plan for [facility] on Monday

## 2025-04-26 NOTE — ED PROVIDER NOTE - NS ED ROS FT
Constitutional: see hpi  Eyes: No visual changes.  ENT: No hearing changes.  Neck: No neck pain or stiffness.  Cardiovascular: No chest pain, palpitations, edema.  Pulmonary: No SOB, cough. No hemoptysis.  Abdominal:  No nausea, vomiting, diarrhea.  : No dysuria, frequency.  Neuro: No headache, syncope, dizziness.  MS: No back pain. No calf pain/swelling.  Psych: No suicidal ideations.

## 2025-04-26 NOTE — ED CDU PROVIDER INITIAL DAY NOTE - CLINICAL SUMMARY MEDICAL DECISION MAKING FREE TEXT BOX
74-year-old man, history of hypertension, hyperlipidemia was placed in CDU after he presented with insomnia for the last 10 days, feeling anxious at night, in the last few days with some mild chest pressure.  No prior cardiac workup.  On my eval, patient is very well-appearing.  I spoke at length with him using a Mandarin  and he insisted that his main issue is sleep deprivation, he does not know why he is unable to sleep but feels that he would be much better if he could.  Exam as noted.  ED workup unremarkable, troponins reassuring, EKG with no acute ischemic changes.  He was monitored in CDU, nuclear stress test was suggestive of ischemia.  Patient was admitted to telemetry for likely cardiac cath.

## 2025-04-26 NOTE — ED CDU PROVIDER DISPOSITION NOTE - WR INTERPRETATION DATE TIME  1
Aopt with hematology for follow up is made. I reached out to provider to see if I should recheck CBC. The recommended follow up lab when she is seen with them next week. Reviewed with parent s/s of bleeding.   26-Apr-2025 05:53

## 2025-04-26 NOTE — ED PROVIDER NOTE - PHYSICAL EXAMINATION
Constitutional: Well developed, well nourished. NAD  Head: Normocephalic, atraumatic.  Eyes: PERRL, EOMI.  ENT: No nasal discharge. Mucous membranes dry.  Neck: Supple. Painless ROM.  Cardiovascular:   Regular rate and rhythm.   Pulmonary:  Lungs clear to auscultation bilaterally.   Abdominal: Soft. Nondistended. No rebound, guarding, rigidity.  Extremities. Pelvis stable. No lower extremity edema, symmetric calves.  Skin: No rashes, cyanosis.  Neuro: AAOx3. No focal neurological deficits.  Psych: Normal mood. Normal affect.

## 2025-04-26 NOTE — PATIENT PROFILE ADULT - FALL HARM RISK - HARM RISK INTERVENTIONS

## 2025-04-26 NOTE — ED CDU PROVIDER INITIAL DAY NOTE - PROGRESS NOTE DETAILS
[address] [facility],2[address] ACUTE CARE PHYSICAL THERAPY EVALUATION  Estefany Munoz, 1959, OR/NONE, 11/10/2020    History  [place]:  Yes  Occupation: Full time employment  Type of occupation: [place] nurse manager    Examination of body systems (includes body structures/functions, activity/participation limitations):  · Observation:  Supine in bed upon arrival with RN attending, pt aware and conversational  · Vision:  WFL, glasses   · Hearing:  [facility]  · Patient comfortable, c/o unable to sleep at night. Also reports frequent urination at night due to unable to sleep. Sodium noted, repeat BMP sent. + chest tightness when he can't sleep. Nuc stress test ordered. Pt speaks Mandarin and English. Mandarin intrperter #[id number] provided  Patient comfortable, c/o unable to sleep at night. Also reports frequent urination at night due to unable to sleep. Sodium noted, repeat BMP sent. + chest tightness when he can't sleep. Nuc stress test ordered. Nuc stress test +. WIll Admit. Per cards tele, admit to 4t under Dr Levi Wang

## 2025-04-26 NOTE — ED PROVIDER NOTE - CARE PLAN
Principal Discharge DX:	Chest pain  Secondary Diagnosis:	Insomnia  Secondary Diagnosis:	Hyponatremia   1

## 2025-04-26 NOTE — H&P ADULT - NS ATTEND AMEND GEN_ALL_CORE FT
Patient seen and examined. Pertinent labs, imaging and telemetry reviewed. I agree with the above:     Patient feeling better. No further CP.   -Still complaining of insomnia.    74M with HTN, HLD, prediabetes, prostate CA s/p radiation presents with CP, insomnia found to have +stress with inferolateral wall ischemia.   -Continue ASA. Increase atorvastatin 10mg to 20mg PO daily.   -NPO after MN Sunday for LHC.   -Check TTE.   -Trazodone for sleep.   -Bowel regimen for constipation.

## 2025-04-27 LAB
ANION GAP SERPL CALC-SCNC: 9 MMOL/L — SIGNIFICANT CHANGE UP (ref 7–14)
BUN SERPL-MCNC: 12 MG/DL — SIGNIFICANT CHANGE UP (ref 10–20)
CALCIUM SERPL-MCNC: 9 MG/DL — SIGNIFICANT CHANGE UP (ref 8.4–10.5)
CHLORIDE SERPL-SCNC: 98 MMOL/L — SIGNIFICANT CHANGE UP (ref 98–110)
CHOLEST SERPL-MCNC: 197 MG/DL — SIGNIFICANT CHANGE UP
CO2 SERPL-SCNC: 24 MMOL/L — SIGNIFICANT CHANGE UP (ref 17–32)
CREAT SERPL-MCNC: 0.8 MG/DL — SIGNIFICANT CHANGE UP (ref 0.7–1.5)
EGFR: 92 ML/MIN/1.73M2 — SIGNIFICANT CHANGE UP
EGFR: 92 ML/MIN/1.73M2 — SIGNIFICANT CHANGE UP
GLUCOSE SERPL-MCNC: 116 MG/DL — HIGH (ref 70–99)
HCT VFR BLD CALC: 42.4 % — SIGNIFICANT CHANGE UP (ref 42–52)
HDLC SERPL-MCNC: 97 MG/DL — SIGNIFICANT CHANGE UP
HGB BLD-MCNC: 15 G/DL — SIGNIFICANT CHANGE UP (ref 14–18)
LDLC SERPL-MCNC: 84 MG/DL — SIGNIFICANT CHANGE UP
LIPID PNL WITH DIRECT LDL SERPL: 84 MG/DL — SIGNIFICANT CHANGE UP
MAGNESIUM SERPL-MCNC: 2.2 MG/DL — SIGNIFICANT CHANGE UP (ref 1.8–2.4)
MCHC RBC-ENTMCNC: 31.7 PG — HIGH (ref 27–31)
MCHC RBC-ENTMCNC: 35.4 G/DL — SIGNIFICANT CHANGE UP (ref 32–37)
MCV RBC AUTO: 89.6 FL — SIGNIFICANT CHANGE UP (ref 80–94)
NONHDLC SERPL-MCNC: 100 MG/DL — SIGNIFICANT CHANGE UP
NRBC BLD AUTO-RTO: 0 /100 WBCS — SIGNIFICANT CHANGE UP (ref 0–0)
PLATELET # BLD AUTO: 213 K/UL — SIGNIFICANT CHANGE UP (ref 130–400)
PMV BLD: 9.5 FL — SIGNIFICANT CHANGE UP (ref 7.4–10.4)
POTASSIUM SERPL-MCNC: 4.2 MMOL/L — SIGNIFICANT CHANGE UP (ref 3.5–5)
POTASSIUM SERPL-SCNC: 4.2 MMOL/L — SIGNIFICANT CHANGE UP (ref 3.5–5)
RBC # BLD: 4.73 M/UL — SIGNIFICANT CHANGE UP (ref 4.7–6.1)
RBC # FLD: 12.3 % — SIGNIFICANT CHANGE UP (ref 11.5–14.5)
SODIUM SERPL-SCNC: 131 MMOL/L — LOW (ref 135–146)
TRIGL SERPL-MCNC: 92 MG/DL — SIGNIFICANT CHANGE UP
WBC # BLD: 6.02 K/UL — SIGNIFICANT CHANGE UP (ref 4.8–10.8)
WBC # FLD AUTO: 6.02 K/UL — SIGNIFICANT CHANGE UP (ref 4.8–10.8)

## 2025-04-27 PROCEDURE — 93306 TTE W/DOPPLER COMPLETE: CPT | Mod: 26

## 2025-04-27 PROCEDURE — 99223 1ST HOSP IP/OBS HIGH 75: CPT | Mod: FS

## 2025-04-27 PROCEDURE — 93010 ELECTROCARDIOGRAM REPORT: CPT

## 2025-04-27 RX ORDER — TRAZODONE HCL 100 MG
50 TABLET ORAL AT BEDTIME
Refills: 0 | Status: DISCONTINUED | OUTPATIENT
Start: 2025-04-27 | End: 2025-04-29

## 2025-04-27 RX ORDER — SENNA 187 MG
2 TABLET ORAL AT BEDTIME
Refills: 0 | Status: DISCONTINUED | OUTPATIENT
Start: 2025-04-27 | End: 2025-05-01

## 2025-04-27 RX ORDER — POLYETHYLENE GLYCOL 3350 17 G/17G
17 POWDER, FOR SOLUTION ORAL DAILY
Refills: 0 | Status: DISCONTINUED | OUTPATIENT
Start: 2025-04-27 | End: 2025-05-01

## 2025-04-27 RX ORDER — ATORVASTATIN CALCIUM 80 MG/1
20 TABLET, FILM COATED ORAL AT BEDTIME
Refills: 0 | Status: DISCONTINUED | OUTPATIENT
Start: 2025-04-27 | End: 2025-04-28

## 2025-04-27 RX ADMIN — POLYETHYLENE GLYCOL 3350 17 GRAM(S): 17 POWDER, FOR SOLUTION ORAL at 11:04

## 2025-04-27 RX ADMIN — Medication 5 MILLIGRAM(S): at 22:12

## 2025-04-27 RX ADMIN — Medication 81 MILLIGRAM(S): at 11:05

## 2025-04-27 RX ADMIN — LOSARTAN POTASSIUM 50 MILLIGRAM(S): 100 TABLET, FILM COATED ORAL at 05:49

## 2025-04-27 RX ADMIN — Medication 2 TABLET(S): at 22:12

## 2025-04-27 RX ADMIN — ATORVASTATIN CALCIUM 20 MILLIGRAM(S): 80 TABLET, FILM COATED ORAL at 22:12

## 2025-04-27 RX ADMIN — AMLODIPINE BESYLATE 5 MILLIGRAM(S): 10 TABLET ORAL at 05:49

## 2025-04-27 RX ADMIN — Medication 50 MILLIGRAM(S): at 22:12

## 2025-04-27 RX ADMIN — HEPARIN SODIUM 5000 UNIT(S): 1000 INJECTION INTRAVENOUS; SUBCUTANEOUS at 05:49

## 2025-04-27 RX ADMIN — Medication 25 MILLIGRAM(S): at 19:42

## 2025-04-28 ENCOUNTER — TRANSCRIPTION ENCOUNTER (OUTPATIENT)
Age: 75
End: 2025-04-28

## 2025-04-28 LAB
A1C WITH ESTIMATED AVERAGE GLUCOSE RESULT: 6.1 % — HIGH (ref 4–5.6)
ANION GAP SERPL CALC-SCNC: 9 MMOL/L — SIGNIFICANT CHANGE UP (ref 7–14)
BUN SERPL-MCNC: 15 MG/DL — SIGNIFICANT CHANGE UP (ref 10–20)
CALCIUM SERPL-MCNC: 7.9 MG/DL — LOW (ref 8.4–10.5)
CHLORIDE SERPL-SCNC: 100 MMOL/L — SIGNIFICANT CHANGE UP (ref 98–110)
CO2 SERPL-SCNC: 22 MMOL/L — SIGNIFICANT CHANGE UP (ref 17–32)
CREAT SERPL-MCNC: 0.9 MG/DL — SIGNIFICANT CHANGE UP (ref 0.7–1.5)
EGFR: 89 ML/MIN/1.73M2 — SIGNIFICANT CHANGE UP
EGFR: 89 ML/MIN/1.73M2 — SIGNIFICANT CHANGE UP
ESTIMATED AVERAGE GLUCOSE: 128 MG/DL — HIGH (ref 68–114)
GLUCOSE SERPL-MCNC: 117 MG/DL — HIGH (ref 70–99)
POTASSIUM SERPL-MCNC: 3.7 MMOL/L — SIGNIFICANT CHANGE UP (ref 3.5–5)
POTASSIUM SERPL-SCNC: 3.7 MMOL/L — SIGNIFICANT CHANGE UP (ref 3.5–5)
SODIUM SERPL-SCNC: 131 MMOL/L — LOW (ref 135–146)
TSH SERPL-MCNC: 2.68 UIU/ML — SIGNIFICANT CHANGE UP (ref 0.27–4.2)

## 2025-04-28 PROCEDURE — 93458 L HRT ARTERY/VENTRICLE ANGIO: CPT | Mod: 26,XU

## 2025-04-28 PROCEDURE — 99233 SBSQ HOSP IP/OBS HIGH 50: CPT

## 2025-04-28 PROCEDURE — 92978 ENDOLUMINL IVUS OCT C 1ST: CPT | Mod: 26,LC

## 2025-04-28 PROCEDURE — 93010 ELECTROCARDIOGRAM REPORT: CPT

## 2025-04-28 PROCEDURE — 93010 ELECTROCARDIOGRAM REPORT: CPT | Mod: 77

## 2025-04-28 PROCEDURE — 92928 PRQ TCAT PLMT NTRAC ST 1 LES: CPT | Mod: LC

## 2025-04-28 RX ORDER — CARVEDILOL 3.12 MG/1
6.25 TABLET, FILM COATED ORAL EVERY 12 HOURS
Refills: 0 | Status: DISCONTINUED | OUTPATIENT
Start: 2025-04-28 | End: 2025-05-01

## 2025-04-28 RX ORDER — DIPHENHYDRAMINE HCL 12.5MG/5ML
50 ELIXIR ORAL ONCE
Refills: 0 | Status: COMPLETED | OUTPATIENT
Start: 2025-04-28 | End: 2025-04-28

## 2025-04-28 RX ORDER — CLOPIDOGREL BISULFATE 75 MG/1
600 TABLET, FILM COATED ORAL ONCE
Refills: 0 | Status: COMPLETED | OUTPATIENT
Start: 2025-04-28 | End: 2025-04-28

## 2025-04-28 RX ORDER — METOPROLOL SUCCINATE 50 MG/1
12.5 TABLET, EXTENDED RELEASE ORAL ONCE
Refills: 0 | Status: COMPLETED | OUTPATIENT
Start: 2025-04-28 | End: 2025-04-28

## 2025-04-28 RX ORDER — BISACODYL 5 MG
10 TABLET, DELAYED RELEASE (ENTERIC COATED) ORAL DAILY
Refills: 0 | Status: DISCONTINUED | OUTPATIENT
Start: 2025-04-28 | End: 2025-05-01

## 2025-04-28 RX ORDER — ATORVASTATIN CALCIUM 80 MG/1
40 TABLET, FILM COATED ORAL AT BEDTIME
Refills: 0 | Status: DISCONTINUED | OUTPATIENT
Start: 2025-04-28 | End: 2025-05-01

## 2025-04-28 RX ORDER — CLOPIDOGREL BISULFATE 75 MG/1
75 TABLET, FILM COATED ORAL DAILY
Refills: 0 | Status: DISCONTINUED | OUTPATIENT
Start: 2025-04-29 | End: 2025-05-01

## 2025-04-28 RX ORDER — MELATONIN 5 MG
10 TABLET ORAL ONCE
Refills: 0 | Status: DISCONTINUED | OUTPATIENT
Start: 2025-04-28 | End: 2025-05-01

## 2025-04-28 RX ADMIN — Medication 81 MILLIGRAM(S): at 09:33

## 2025-04-28 RX ADMIN — ATORVASTATIN CALCIUM 40 MILLIGRAM(S): 80 TABLET, FILM COATED ORAL at 21:43

## 2025-04-28 RX ADMIN — Medication 500 MILLILITER(S): at 10:39

## 2025-04-28 RX ADMIN — METOPROLOL SUCCINATE 12.5 MILLIGRAM(S): 50 TABLET, EXTENDED RELEASE ORAL at 10:32

## 2025-04-28 RX ADMIN — CLOPIDOGREL BISULFATE 600 MILLIGRAM(S): 75 TABLET, FILM COATED ORAL at 11:00

## 2025-04-28 RX ADMIN — POLYETHYLENE GLYCOL 3350 17 GRAM(S): 17 POWDER, FOR SOLUTION ORAL at 17:46

## 2025-04-28 RX ADMIN — Medication 150 MILLILITER(S): at 12:00

## 2025-04-28 RX ADMIN — LOSARTAN POTASSIUM 50 MILLIGRAM(S): 100 TABLET, FILM COATED ORAL at 06:21

## 2025-04-28 RX ADMIN — CARVEDILOL 6.25 MILLIGRAM(S): 3.12 TABLET, FILM COATED ORAL at 17:06

## 2025-04-28 RX ADMIN — AMLODIPINE BESYLATE 5 MILLIGRAM(S): 10 TABLET ORAL at 06:20

## 2025-04-28 RX ADMIN — Medication 2 TABLET(S): at 21:42

## 2025-04-28 NOTE — DISCHARGE NOTE PROVIDER - HOSPITAL COURSE
Patient is a 74y Mandarin speaking Male, but able to communicate in English with PMHx of HTN, HLD, pre-diabetes, h/o prostate Ca -> s/p radiation 2018, who presented to ED with inability to sleep few days, and the past couple of days c/o chest pressure at night, stating that "insomnia causing my chest pain." In ED, troponin negative x 2.  Pt underwent a NST on 4/26/25 which revealed moderate-to-large size reversible defect in the inferolateral wall of the left ventricle consistent with ischemia, EF 70%. On 04/28/2025 patient underwent LHC which revealed     Coronary Dominance: Right dominate  LM: 30% lesion prox LM  LAD: Diffused calcified 80% lesion mid LAD amendable to PCI  CX: 99% lesion Prox LCX s/p PCI, 90% lesion OM1 s/p PCI  RCA: 40% lesion prox RCA     LVEDP: 14 mmHg     SYNERGY 2.75 x 32mm to pLCX (AUC 8)    Patient was monitored overnight. On POD 1 patient remains HD stable with no complaints. Patient remains in SR with no arrhythmias noted on tele. EKG performed showed no acute ST changes. Examination of right radial artery showed a C/DI site with no hematoma, erythema or bruit. Distal pulses are 2+ bilaterally. Renal function remains stable post cath. Patient is scheduled for a staged PCI to LAD on 4/30. Patient will be discharged home on DAPT with ASA and Plavix. Patient is a 74y Mandarin speaking Male, but able to communicate in English with PMHx of HTN, HLD, pre-diabetes, h/o prostate Ca -> s/p radiation 2018, who presented to ED with inability to sleep few days, and the past couple of days c/o chest pressure at night, stating that "insomnia causing my chest pain." In ED, troponin negative x 2.  Pt underwent a NST on 4/26/25 which revealed moderate-to-large size reversible defect in the inferolateral wall of the left ventricle consistent with ischemia, EF 70%.  On 04/28/2025 patient underwent LHC which revealed:    Coronary Dominance: Right dominate  LM: 30% lesion prox LM  LAD: Diffused calcified 80% lesion mid LAD amendable to PCI  CX: 99% lesion Prox LCX s/p PCI, 90% lesion OM1 s/p PCI  RCA: 40% lesion prox RCA     LVEDP: 14 mmHg     SYNERGY 2.75 x 32mm to pLCX (AUC 8)    On 4/30/2025 patient underwent LHC for staged PCI to LAD:  Intervention: Successful IVUS-guided PCI of mLAD with balloon angioplasty s/p MONSERRAT x1  Implants: SYNERGY 3.0 X 38 MM  AUC: 7  FINDINGS:   Coronary Dominance: Right   LM: 30% prox LM lesion    LAD: 80% mLAD calcified lesion s/p PCI    D1: 30% ostial stenosis   CX: patent stent in pCX, moderate disease distally   OM1: patent stent in OM1   RCA: right not injected      Patient was monitored overnight. On POD 1 patient remains HD stable with no complaints. Patient remains in SR with no arrhythmias noted on tele. EKG performed showed no acute ST changes. Examination of right radial artery showed a C/DI site with no hematoma, erythema or bruit. Distal pulses are 2+ bilaterally. Renal function remains stable post cath.  Patient will be discharged home on DAPT with ASA and Plavix.   Patient is a 74y Mandarin speaking Male, but able to communicate in English with PMHx of HTN, HLD, pre-diabetes, h/o prostate Ca -> s/p radiation 2018, who presented to ED with inability to sleep few days, and the past couple of days c/o chest pressure at night, stating that "insomnia causing my chest pain." In ED, troponin negative x 2.  Pt underwent a NST on 4/26/25 which revealed moderate-to-large size reversible defect in the inferolateral wall of the left ventricle consistent with ischemia, EF 70%.  On 04/28/2025 patient underwent LHC which revealed:    Coronary Dominance: Right dominate  LM: 30% lesion prox LM  LAD: Diffused calcified 80% lesion mid LAD amendable to PCI  CX: 99% lesion Prox LCX s/p PCI, 90% lesion OM1 s/p PCI  RCA: 40% lesion prox RCA     LVEDP: 14 mmHg     SYNERGY 2.75 x 32mm to pLCX (AUC 8)    On 4/30/2025 patient underwent LHC for staged PCI to LAD:    Intervention: Successful IVUS-guided PCI of mLAD with balloon angioplasty s/p MONSERRAT x1  Implants: SYNERGY 3.0 X 38 MM  AUC: 7  FINDINGS:   Coronary Dominance: Right   LM: 30% prox LM lesion    LAD: 80% mLAD calcified lesion s/p PCI    D1: 30% ostial stenosis   CX: patent stent in pCX, moderate disease distally   OM1: patent stent in OM1   RCA: right not injected       Patient was monitored overnight. On POD 1 patient remains HD stable with no complaints. Patient remains in SR with no arrhythmias noted on tele. EKG performed showed no acute ST changes. Examination of right radial artery showed a C/DI site with no hematoma, erythema or bruit. Distal pulses are 2+ bilaterally. Renal function remains stable post cath.  Patient will be discharged home on DAPT with ASA and Plavix.

## 2025-04-28 NOTE — DISCHARGE NOTE PROVIDER - CARE PROVIDER_API CALL
Lien Torres  Cardiovascular Disease  [address] [place] [id number]  Phone: [phone]  Fax: [phone]  Scheduled Appointment: 05/12/2025 12:00 PM

## 2025-04-28 NOTE — DISCHARGE NOTE PROVIDER - NSDCCPCAREPLAN_GEN_ALL_CORE_FT
PRINCIPAL DISCHARGE DIAGNOSIS  Diagnosis: Chest pain  Assessment and Plan of Treatment:       SECONDARY DISCHARGE DIAGNOSES  Diagnosis: Insomnia  Assessment and Plan of Treatment:     Diagnosis: Hyponatremia  Assessment and Plan of Treatment:      PRINCIPAL DISCHARGE DIAGNOSIS  Diagnosis: Chest pain  Assessment and Plan of Treatment: Please take Aspirn and Plavix as prescribed  Please take Losartan. Carvedilol, and Amlodipine as prescribed  Please take Atorvastatin at prescribed  Please see Dr. Torres in 2 weeks

## 2025-04-28 NOTE — CHART NOTE - NSCHARTNOTEFT_GEN_A_CORE
PRE-OP DIAGNOSIS:  Unstable Angina      PROCEDURE:     [X] Coronary Angiogram     [X] LHC     [] LVG     [] RHC     [X] Intervention (see below)         PHYSICIAN:  Dr. Pickett    ASSISTANT:  Dr. Jamison       PROCEDURE DESCRIPTION:     Consent:      [X] Patient     [] Family Member     []  Used        Anesthesia:     [X] General     [] Sedation     [X] Local        Access & Closure:     [X] 6Fr R Radial Artery     [] Fr Femoral Artery     [] Fr Femoral Vein     [] Fr Brachial Vein       IV Contrast: 120mL        Intervention:   OCT  MONSERRAT PCI to pLcx    Implants:    SYNERGY 2.75 x 32mm to pLCX (AUC 8)    FINDINGS:     Coronary Dominance: Right dominate      LM: 30% lesion prox LM    LAD: Diffused calcified 80% lesion mid LAD amendable to PCI    CX: 99% lesion Prox LCX s/p PCI, 90% lesion OM1 s/p PCI    RCA: 40% lesion prox RCA     LVEDP: 14 mmHg     ESTIMATED BLOOD LOSS: < 10 mL        CONDITION:     [X] Good     [] Fair     [] Critical        SPECIMEN REMOVED: N/A       POST-OP DIAGNOSIS:      [] Normal Coronary Angiogram     [] Mild Coronary Artery Disease (< 50% stenosis)     [X] 2 Vessel Coronary Artery Disease        PLAN OF CARE:     [] D/C Home Today     [] Return to In-patient bed     [] Admit for observation     [X] Return for Staged Procedure 04/30 for LAD    [] CT Surgery Consult     [X] Medications: DAPT, Statin, BB    [X] IV Fluids: NS 100cc/hr x 6hrs

## 2025-04-28 NOTE — DISCHARGE NOTE PROVIDER - NSDCFUSCHEDAPPT_GEN_ALL_CORE_FT
[place]  [facility] Physician [facility]  UROLOGY [address] [facility]  Scheduled Appointment: 06/12/2025     Lien Torres  [facility]  CARDIOLOGY [address]  Scheduled Appointment: 05/12/2025    [place]  [facility]  UROLOGY [address] [facility]  Scheduled Appointment: 06/12/2025

## 2025-04-28 NOTE — CHART NOTE - NSCHARTNOTEFT_GEN_A_CORE
INTERVENTIONAL CARDIOLOGY:                                               PREOPERATIVE DAY OF PROCEDURE EVALUATION:  I have personally seen and examined the patient.  I agree with the history and physical which I have reviewed and noted any changes below.         73 y/o Mandarin speaking M, but able to communicate in English, with PMHx of HTN, HLD, pre-diabetes, h/o prostate Ca -> s/p radiation 2018, who presented to ED with inability to sleep few days, and the past couple of days c/ochest pressure at night, trops negative x 2, with subsequent abnormal NST which revealing moderate-to-large size reversible inferolateral wall ischemia. Pt is being admitted to [facility] for further management.    #Unstable angina  -trops negative x 2  - NST with inferolateral wall ischemia  -TTE read pend   -Cont Aspirin 81mg daily   - Heparin Subq on hold for cath   -continue home Amlodipine 2.5mg   -continue home Losartan 25mg  -continue home atorvastatin 20mg  -check ldl 84, A1C, TFTs  -NPO for LHC         Pre cath note:  indication:  [ ] STEMI                [ ] NSTEMI                 [ ] Acute coronary syndrome                   [ x]Unstable Angina   [ ] high risk  [ ] intermediate risk  [ ] low risk                   [ ] Stable Angina     non-invasive testing:          NST                Date:       4/26/25              result: [ ] high risk  [x ] intermediate risk  [ ] low risk    Anti- Anginal medications:                    [ ] not used d/t                     [ ] used   ( ) BB     ( ) CCB      ( ) Nitrate   (  ) Ranexa          [ ] not used but strong indication not to use    Ejection Fraction                   [ ] <29            [ ] 30-39%   [ ] 40-49%     [ ]>50%    CHF          [ ] active (within last 14 days on meds   [ ] Chronic (on meds but no exacerbation)  NYHA Functional Class:  (  ) Class I (no limitations)  (  ) Class II (slight limitation)  (  ) Class III (marked limitation)  (  ) Class IV (symptoms at rest)    COPD                   [ ] mild (on chronic bronchodilators)  [ ] moderate (on chronic steroid therapy)      [ ] severe (indication for home O2 or PACO2 >50)    Other risk factors:                     [ ] Previous MI                     [ ] CVA/ stroke                    [ ] carotid stent/ CEA                    [ ] PVD/PAD- (arterial aneurysm, non-palpable pulses, tortuous vessel with inability to insert catheter, infra-renal dissection, renal or subclavian artery stenosis)                    [ ] previous CABG                    [ ] Renal Failure:  on HD  (  ) yes  (  ) no                    [ ] Diabetic  (  ) Type 1  (  ) Type 2                                         (  ) Insulin dependent  (  ) non-insulin dependent                                         (  ) Metformin  (  ) Januvia  (  ) Glimepiride  (  ) Glipizide  (  ) Glyburide  (  ) Actos                                         (  ) GLP-1 receptor agonists (Ozempic, Wegovy, Zepbound, Mounjaro, Trulicity, Byetta, Victoza)                                         (  ) SGLT2 Inhibitors (Farxiga, Jardiance, Invokana)                                         (  ) Other                            15.0   6.02  )-----------( 213      ( 27 Apr 2025 05:48 )             42.4     04-27    131[L]  |  98  |  12  ----------------------------<  116[H]  4.2   |  24  |  0.8    Ca    9.0      27 Apr 2025 05:48  Mg     2.2     04-27        Bleeding Risk:     Pre-cath Hydration: (  )  cc IV bolus x 1 over 1 hr followed by:    (  ) NS @ 75cc/hr until procedure (up to 2 hrs) if EF> 50%                                                                                                                             (  ) NS @ 50cc/hr until procedure (up to 2 hrs) if EF< 50%                                        (  ) No precath hydration d/t      RIGHT RADIAL ARTERY EVALUATION:  REYNA TEST: [] Negative          [] Positive    EF:   Date:    EKG:   Date:       (Signed electronically by __________)  04-28-25 @ 09:37          73 y/o Mandarin speaking M, but able to communicate in English, with PMHx of HTN, HLD, pre-diabetes, h/o prostate Ca -> s/p radiation 2018, who presented to ED with inability to sleep few days, and the past couple of days c/ochest pressure at night, trops negative x 2, with subsequent abnormal NST which revealing moderate-to-large size reversible inferolateral wall ischemia. Pt is being admitted to [facility] for further management.    #Unstable angina  -trops negative x 2  - NST with inferolateral wall ischemia  -TTE read pend   -Cont Aspirin 81mg daily   - Heparin Subq on hold for cath   -continue home Amlodipine 2.5mg   -continue home Losartan 25mg  -continue home atorvastatin 20mg  -check ldl 84, A1C, TFTs  -NPO for [facility] tomorrow INTERVENTIONAL CARDIOLOGY:                                               PREOPERATIVE DAY OF PROCEDURE EVALUATION:  I have personally seen and examined the patient.  I agree with the history and physical which I have reviewed and noted any changes below.         75 y/o Mandarin speaking M, but able to communicate in English, with PMHx of HTN, HLD, pre-diabetes(not on medication), h/o prostate Ca -> s/p radiation 2018, who presented to ED with inability to sleep few days, and the past couple of days c/ochest pressure at night, trops negative x 2, with subsequent abnormal NST which revealing moderate-to-large size reversible inferolateral wall ischemia. Pt presents for [facility] with possible intervention.             Pre cath note:  indication:  [ ] STEMI                [ ] NSTEMI                 [ ] Acute coronary syndrome                   [ x]Unstable Angina   [ ] high risk  [ ] intermediate risk  [ ] low risk                   [ x] Stable Angina     non-invasive testing:          NST                Date:       4/26/25              result: [ ] high risk  [x ] intermediate risk  [ ] low risk    Anti- Anginal medications:                    [ ] not used d/t                     [x used   (x ) BB     (x ) CCB      ( ) Nitrate   (  ) Ranexa          [ ] not used but strong indication not to use    Ejection Fraction                   [ ] <29            [ ] 30-39%   [ ] 40-49%     [ x]>50%    CHF          [ ] active (within last 14 days on meds   [ ] Chronic (on meds but no exacerbation)  NYHA Functional Class:  (  ) Class I (no limitations)  (  ) Class II (slight limitation)  (  ) Class III (marked limitation)  (  ) Class IV (symptoms at rest)    COPD                   [ ] mild (on chronic bronchodilators)  [ ] moderate (on chronic steroid therapy)      [ ] severe (indication for home O2 or PACO2 >50)    Other risk factors:                     [ ] Previous MI                     [ ] CVA/ stroke                    [ ] carotid stent/ CEA                    [ ] PVD/PAD- (arterial aneurysm, non-palpable pulses, tortuous vessel with inability to insert catheter, infra-renal dissection, renal or subclavian artery stenosis)                    [ ] previous CABG                    [ ] Renal Failure:  on HD  (  ) yes  (  ) no                    [ x] Diabetic  (  ) Type 1  (x  ) Type 2                                         (  ) Insulin dependent  ( x ) non-insulin dependent- no medications                                         (  ) Metformin  (  ) Januvia  (  ) Glimepiride  (  ) Glipizide  (  ) Glyburide  (  ) Actos                                         (  ) GLP-1 receptor agonists (Ozempic, Wegovy, Zepbound, Mounjaro, Trulicity, Byetta, Victoza)                                         (  ) SGLT2 Inhibitors (Farxiga, Jardiance, Invokana)                                         (  ) Other                            15.0   6.02  )-----------( 213      ( 27 Apr 2025 05:48 )             42.4     04-27    131[L]  |  98  |  12  ----------------------------<  116[H]  4.2   |  24  |  0.8    Ca    9.0      27 Apr 2025 05:48  Mg     2.2     04-27        Bleeding Risk: 1.3%    Pre-cath Hydration: (  x)  cc IV bolus x 1 over 1 hr followed by:    ( x ) NS @ 75cc/hr until procedure (up to 2 hrs) if EF> 50%                                                                                                                             (  ) NS @ 50cc/hr until procedure (up to 2 hrs) if EF< 50%                                        (  ) No precath hydration d/t      RIGHT RADIAL ARTERY EVALUATION:  REYNA TEST: [] Negative          [] Positive    EF:   Date: < from: TTE Echo Complete w/ Contrast w/o Doppler (04.27.25 @ 09:22) >      Summary:   1. Normal global left ventricular systolic function.   2. LV Ejection Fraction by Perera's Method with a biplane EF of 62 %.   3. Spectral Doppler shows impaired relaxation pattern of left   ventricular myocardial filling (Grade I diastolic dysfunction).   4. Normal left atrial size.   5. Normal right atrial size.   6. Mild mitral valve regurgitation.   7. Mild tricuspid regurgitation.   8. Sclerotic aortic valve with normal opening.   9. Mild to moderate aortic regurgitation.  10. There is no evidence of pericardial effusion.            EKG:   Date:< from: 12 Lead ECG (04.27.25 @ 07:21) >    Diagnosis Line Normal sinus rhythm  Left ventricular hypertrophy with repolarization abnormality ( R in aVL ,   Romhilt-Norman )  Abnormal ECG               (Signed electronically by __________)  04-28-25 @ 09:37          75 y/o Mandarin speaking M, but able to communicate in English, with PMHx of HTN, HLD, pre-diabetes, h/o prostate Ca -> s/p radiation 2018, who presented to ED with inability to sleep few days, and the past couple of days c/ochest pressure at night, trops negative x 2, with subsequent abnormal NST which revealing moderate-to-large size reversible inferolateral wall ischemia. Pt is being admitted to [facility] for further management.    #Unstable angina  -trops negative x 2  - NST with inferolateral wall ischemia  -TTE read pend   -Cont Aspirin 81mg daily   - Heparin Subq on hold for cath   -continue home Amlodipine 2.5mg   -continue home Losartan 25mg  -continue home atorvastatin 20mg  -check ldl 84, A1C, TFTs  -NPO for C tomorrow

## 2025-04-28 NOTE — DISCHARGE NOTE PROVIDER - NSDCMRMEDTOKEN_GEN_ALL_CORE_FT
amLODIPine 2.5 mg oral tablet: 1 tab(s) orally once a day  atorvastatin 10 mg oral tablet: 1 tab(s) orally once a day  icosapent 1 g oral capsule: 2 cap(s) orally once a day  losartan 25 mg oral tablet: 1 tab(s) orally once a day   Adult Aspirin Regimen 81 mg oral delayed release tablet: 1 tab(s) orally once a day  amLODIPine 5 mg oral tablet: 1 tab(s) orally once a day  atorvastatin 40 mg oral tablet: 1 tab(s) orally once a day (at bedtime)  Coreg 6.25 mg oral tablet: 1 tab(s) orally every 12 hours  icosapent 1 g oral capsule: 2 cap(s) orally once a day  losartan 50 mg oral tablet: 1 tab(s) orally once a day  Plavix 75 mg oral tablet: 1 tab(s) orally once a day  zolpidem 5 mg oral tablet: 1 tab(s) orally once a day (at bedtime) As needed Insomnia

## 2025-04-28 NOTE — DISCHARGE NOTE PROVIDER - NSDCCPTREATMENT_GEN_ALL_CORE_FT
PRINCIPAL PROCEDURE  Procedure: Left heart catheterization  Findings and Treatment: - Please take aspirin 81 mg daily and Plavix 75mg daily, unless directed by your Cardiologist.  - Do not drive or operate heavy machinery for 24 hours.  - After 24 hours, you may shower and remove the dressing from the site.  - Avoid using affected arm for 24 hours.  - No heavy lifting (objects more than 5 lbs) for 1 week.  - Do not bathe or swim for 1 week.   - Do not rub or apply lotion, cream, or powder to the affected site. Leave it open to the air.   - Any sudden swelling, redness, fever, discharge, or severe pain, call your Cardiologist or call the Catheterization Lab at [phone].  - If there is bleeding from the puncture site, apply direct firm pressure on the site and call [phone].     PRINCIPAL PROCEDURE  Procedure: Left heart catheterization  Findings and Treatment: - Please take aspirin 81 mg daily and Plavix 75mg daily, unless directed by your Cardiologist.  - Do not drive or operate heavy machinery for 24 hours.  - After 24 hours, you may shower and remove the dressing from the site.  - Avoid using affected arm for 24 hours.  - No heavy lifting (objects more than 5 lbs) for 1 week.  - Do not bathe or swim for 1 week.   - Do not rub or apply lotion, cream, or powder to the affected site. Leave it open to the air.   - Any sudden swelling, redness, fever, discharge, or severe pain, call your Cardiologist or call the Catheterization Lab at [phone].  - If there is bleeding from the puncture site, apply direct firm pressure on the site and call 911.  - Followup with Dr. Lien Torres on 5/12 @ 12pm

## 2025-04-29 LAB
HCT VFR BLD CALC: 38.2 % — LOW (ref 42–52)
HGB BLD-MCNC: 13.1 G/DL — LOW (ref 14–18)
MCHC RBC-ENTMCNC: 31.8 PG — HIGH (ref 27–31)
MCHC RBC-ENTMCNC: 34.3 G/DL — SIGNIFICANT CHANGE UP (ref 32–37)
MCV RBC AUTO: 92.7 FL — SIGNIFICANT CHANGE UP (ref 80–94)
NRBC BLD AUTO-RTO: 0 /100 WBCS — SIGNIFICANT CHANGE UP (ref 0–0)
PLATELET # BLD AUTO: 190 K/UL — SIGNIFICANT CHANGE UP (ref 130–400)
PMV BLD: 9.2 FL — SIGNIFICANT CHANGE UP (ref 7.4–10.4)
RBC # BLD: 4.12 M/UL — LOW (ref 4.7–6.1)
RBC # FLD: 13 % — SIGNIFICANT CHANGE UP (ref 11.5–14.5)
WBC # BLD: 7.97 K/UL — SIGNIFICANT CHANGE UP (ref 4.8–10.8)
WBC # FLD AUTO: 7.97 K/UL — SIGNIFICANT CHANGE UP (ref 4.8–10.8)

## 2025-04-29 PROCEDURE — 99232 SBSQ HOSP IP/OBS MODERATE 35: CPT

## 2025-04-29 PROCEDURE — 93010 ELECTROCARDIOGRAM REPORT: CPT

## 2025-04-29 RX ORDER — MELATONIN 5 MG
3 TABLET ORAL AT BEDTIME
Refills: 0 | Status: DISCONTINUED | OUTPATIENT
Start: 2025-04-29 | End: 2025-04-29

## 2025-04-29 RX ORDER — MELATONIN 5 MG
10 TABLET ORAL AT BEDTIME
Refills: 0 | Status: DISCONTINUED | OUTPATIENT
Start: 2025-04-29 | End: 2025-05-01

## 2025-04-29 RX ADMIN — Medication 81 MILLIGRAM(S): at 11:03

## 2025-04-29 RX ADMIN — Medication 2 TABLET(S): at 21:42

## 2025-04-29 RX ADMIN — Medication 10 MILLIGRAM(S): at 21:43

## 2025-04-29 RX ADMIN — ATORVASTATIN CALCIUM 40 MILLIGRAM(S): 80 TABLET, FILM COATED ORAL at 21:42

## 2025-04-29 RX ADMIN — CARVEDILOL 6.25 MILLIGRAM(S): 3.12 TABLET, FILM COATED ORAL at 17:28

## 2025-04-29 RX ADMIN — CLOPIDOGREL BISULFATE 75 MILLIGRAM(S): 75 TABLET, FILM COATED ORAL at 11:04

## 2025-04-29 RX ADMIN — AMLODIPINE BESYLATE 5 MILLIGRAM(S): 10 TABLET ORAL at 06:26

## 2025-04-29 RX ADMIN — LOSARTAN POTASSIUM 50 MILLIGRAM(S): 100 TABLET, FILM COATED ORAL at 06:26

## 2025-04-29 RX ADMIN — CARVEDILOL 6.25 MILLIGRAM(S): 3.12 TABLET, FILM COATED ORAL at 06:26

## 2025-04-29 RX ADMIN — Medication 1 APPLICATION(S): at 06:26

## 2025-04-30 PROBLEM — C61 MALIGNANT NEOPLASM OF PROSTATE: Chronic | Status: ACTIVE | Noted: 2025-04-26

## 2025-04-30 PROBLEM — R73.03 PREDIABETES: Chronic | Status: ACTIVE | Noted: 2025-04-26

## 2025-04-30 LAB
ANION GAP SERPL CALC-SCNC: 11 MMOL/L — SIGNIFICANT CHANGE UP (ref 7–14)
BUN SERPL-MCNC: 14 MG/DL — SIGNIFICANT CHANGE UP (ref 10–20)
CALCIUM SERPL-MCNC: 8.1 MG/DL — LOW (ref 8.4–10.5)
CHLORIDE SERPL-SCNC: 100 MMOL/L — SIGNIFICANT CHANGE UP (ref 98–110)
CO2 SERPL-SCNC: 22 MMOL/L — SIGNIFICANT CHANGE UP (ref 17–32)
CREAT SERPL-MCNC: 0.9 MG/DL — SIGNIFICANT CHANGE UP (ref 0.7–1.5)
EGFR: 89 ML/MIN/1.73M2 — SIGNIFICANT CHANGE UP
EGFR: 89 ML/MIN/1.73M2 — SIGNIFICANT CHANGE UP
GLUCOSE SERPL-MCNC: 116 MG/DL — HIGH (ref 70–99)
POTASSIUM SERPL-MCNC: 3.9 MMOL/L — SIGNIFICANT CHANGE UP (ref 3.5–5)
POTASSIUM SERPL-SCNC: 3.9 MMOL/L — SIGNIFICANT CHANGE UP (ref 3.5–5)
SODIUM SERPL-SCNC: 133 MMOL/L — LOW (ref 135–146)

## 2025-04-30 PROCEDURE — 99232 SBSQ HOSP IP/OBS MODERATE 35: CPT

## 2025-04-30 PROCEDURE — 92928 PRQ TCAT PLMT NTRAC ST 1 LES: CPT | Mod: LD

## 2025-04-30 PROCEDURE — 92978 ENDOLUMINL IVUS OCT C 1ST: CPT | Mod: 26,LD

## 2025-04-30 PROCEDURE — 93010 ELECTROCARDIOGRAM REPORT: CPT

## 2025-04-30 RX ORDER — CLOPIDOGREL BISULFATE 75 MG/1
225 TABLET, FILM COATED ORAL ONCE
Refills: 0 | Status: COMPLETED | OUTPATIENT
Start: 2025-04-30 | End: 2025-04-30

## 2025-04-30 RX ADMIN — Medication 81 MILLIGRAM(S): at 11:09

## 2025-04-30 RX ADMIN — Medication 150 MILLILITER(S): at 14:52

## 2025-04-30 RX ADMIN — Medication 10 MILLIGRAM(S): at 21:54

## 2025-04-30 RX ADMIN — Medication 5 MILLIGRAM(S): at 00:08

## 2025-04-30 RX ADMIN — Medication 250 MILLILITER(S): at 06:00

## 2025-04-30 RX ADMIN — Medication 5 MILLIGRAM(S): at 22:41

## 2025-04-30 RX ADMIN — CARVEDILOL 6.25 MILLIGRAM(S): 3.12 TABLET, FILM COATED ORAL at 05:19

## 2025-04-30 RX ADMIN — ATORVASTATIN CALCIUM 40 MILLIGRAM(S): 80 TABLET, FILM COATED ORAL at 21:54

## 2025-04-30 RX ADMIN — Medication 1 APPLICATION(S): at 05:19

## 2025-04-30 RX ADMIN — CARVEDILOL 6.25 MILLIGRAM(S): 3.12 TABLET, FILM COATED ORAL at 20:03

## 2025-04-30 RX ADMIN — Medication 2 TABLET(S): at 21:54

## 2025-04-30 RX ADMIN — LOSARTAN POTASSIUM 50 MILLIGRAM(S): 100 TABLET, FILM COATED ORAL at 05:19

## 2025-04-30 RX ADMIN — Medication 500 MILLILITER(S): at 14:30

## 2025-04-30 RX ADMIN — CLOPIDOGREL BISULFATE 225 MILLIGRAM(S): 75 TABLET, FILM COATED ORAL at 14:30

## 2025-04-30 RX ADMIN — AMLODIPINE BESYLATE 5 MILLIGRAM(S): 10 TABLET ORAL at 05:19

## 2025-04-30 RX ADMIN — CLOPIDOGREL BISULFATE 75 MILLIGRAM(S): 75 TABLET, FILM COATED ORAL at 11:09

## 2025-04-30 NOTE — CHART NOTE - NSCHARTNOTEFT_GEN_A_CORE
PROCEDURE:   - Left heart cath  - Intervention     PHYSICIAN: Dr. Pickett  FELLOW: Dr. Petersen, Dr. Jamison    Pre-procedure Diagnosis: Staged PCI of mLAD     Consent: Patient   Anesthesia: Sedation | Local     ACCESS & CLOSURE:  6 Fr R Radial Artery; TR Band     IV Contrast: 120 mL      Intervention: Successful IVUS-guided PCI of mLAD with balloon angioplasty s/p MONSERRAT x1    Implants: SYNERGY 3.0 X 38 MM    AUC: 7     FINDINGS:     Coronary Dominance: Right     LM: 30% prox LM lesion      LAD: 80% mLAD calcified lesion s/p PCI    D1: 30% ostial stenosis     CX: patent stent in pCX, moderate disease distally   OM1: patent stent in OM1     RCA: right not injected      ESTIMATED BLOOD LOSS: < 10 mL      CONDITION: Good   SPECIMEN REMOVED: N/A     POST-OP DIAGNOSIS:    Successful IVUS-guided PCI of mLAD with balloon angioplasty s/p MONSERRAT x1     PLAN OF CARE:   [X] Return to In-patient bed   [X] Medications:   - Continue ASA 81 mg PO QD  - Continue Clopidogrel 75 mg PO QD   - High intensity statin  - Beta Blocker  [X] LVEDP guided IV Fluids: NS @ 100cc/h x 6 hours  [X] Remove TR band. Hold manual pressure if signs of hematoma or bleeding over radial access site. PROCEDURE:   - Left heart cath  - Intervention     PHYSICIAN: Dr. Pickett  FELLOW: Dr. Petersen, Dr. Jamison    Pre-procedure Diagnosis: Staged PCI of mLAD     Consent: Patient   Anesthesia: Sedation | Local     ACCESS & CLOSURE:  6 Fr R Radial Artery; TR Band     IV Contrast: 120 mL      Intervention: Successful IVUS-guided PCI of mLAD with balloon angioplasty s/p MONSERRAT x1    Implants: SYNERGY 3.0 X 38 MM    AUC: 7     FINDINGS:     Coronary Dominance: Right     LM: 20% prox LM lesion      LAD: 80% mLAD calcified lesion s/p PCI    D1: 30% ostial stenosis     CX: patent stent in pCX, moderate disease distally   OM1: patent stent in OM1     RCA: right not injected      ESTIMATED BLOOD LOSS: < 10 mL      CONDITION: Good   SPECIMEN REMOVED: N/A     POST-OP DIAGNOSIS:    Successful IVUS-guided PCI of mLAD with balloon angioplasty s/p MONSERRAT x1     PLAN OF CARE:   [X] Return to In-patient bed   [X] Medications:   - Continue ASA 81 mg PO QD  - Continue Clopidogrel 75 mg PO QD   - High intensity statin  - Beta Blocker  [X] LVEDP guided IV Fluids: NS @ 100cc/h x 6 hours  [X] Remove TR band. Hold manual pressure if signs of hematoma or bleeding over radial access site. PROCEDURE:   - Left heart cath  - Intervention     PHYSICIAN: Dr. Pickett  FELLOW: Dr. Petersen, Dr. Jamison    Pre-procedure Diagnosis: Staged PCI of mLAD     Consent: Patient   Anesthesia: Sedation | Local     ACCESS & CLOSURE:  6 Fr R Radial Artery; TR Band     IV Contrast: 120 mL      Intervention: Successful IVUS-guided PCI of mLAD with balloon angioplasty s/p MONSERRAT x2    Implants: SYNERGY 3.0 X 38 MM  SYNERGY 3.5X28 MM    AUC: 7     FINDINGS:     Coronary Dominance: Right     LM: 20% prox LM lesion      LAD: 80% mLAD calcified lesion s/p PCI    D1: 30% ostial stenosis     CX: patent stent in pCX, moderate disease distally   OM1: patent stent in OM1     RCA: right not injected      ESTIMATED BLOOD LOSS: < 10 mL      CONDITION: Good   SPECIMEN REMOVED: N/A     POST-OP DIAGNOSIS:    Successful IVUS-guided PCI of mLAD with balloon angioplasty s/p MONSERRAT x1     PLAN OF CARE:   [X] Return to In-patient bed   [X] Medications:   - Continue ASA 81 mg PO QD  - Continue Clopidogrel 75 mg PO QD   - High intensity statin  - Beta Blocker  [X] LVEDP guided IV Fluids: NS @ 100cc/h x 6 hours  [X] Remove TR band. Hold manual pressure if signs of hematoma or bleeding over radial access site.

## 2025-04-30 NOTE — PROGRESS NOTE ADULT - NS ATTEND AMEND GEN_ALL_CORE FT
75yo Mandarin-speaking M, but able to communicate in English, with HTN, HLD, pre-diabetes, and history of prostate cancer s/p radiation (2018) who presented to ED with inability to sleep and chest pressure at night. NST demostrated moderate-to-large size reversible defect of the inferolateral wall. [facility] today 4/28/25 with 30% lesion of LM, diffuse calcified 80% lesion mid LAD amendable to PCI, 99% lesion of pLCx and 90% lesion of OM1, and 40% lesion of pRCA. Patient underwent PCI of pLCx/OM1 with MONSERRAT x 1 (SYNERGY 2.75 x 32mm) with plan for staged PCI of mLAD.    Plan:   - Continue ASA 81 mg daily   - Start Plavix 75 mg daily   - Increase atorvastatin to 40 mg nightly  - On amlodipine 5 mg daily (home dose 2.5 mg) and losartan 50 mg daily (home dose 25 mg)  - Start carvedilol 6.25 mg BID  - Call pharmacy to confirm sleep aides, and will order PRN once confirmed  - A1c 6.1  - LDL 84 on atorvastatin 10 mg   - TSH 2.68  - TTE with LVEF 62%, G1DD, normal RV size and function, mild-moderate AI, mild MR, mild TR  - Stage PCI of mLAD on 4/30
Please see H&P for full assessment and plan.
73yo Mandarin-speaking M, but able to communicate in English, with HTN, HLD, pre-diabetes, and history of prostate cancer s/p radiation (2018) who presented to ED with inability to sleep and chest pressure at night. NST demonstrated moderate-to-large size reversible defect of the inferolateral wall. C 4/28/25 with 30% lesion of LM, diffuse calcified 80% lesion mid LAD amendable to PCI, 99% lesion of pLCx and 90% lesion of OM1, and 40% lesion of pRCA. Patient underwent PCI of pLCx/OM1 with MONSERRAT x 1 (SYNERGY 2.75 x 32mm) with plan for staged PCI of mLAD today.     Plan:   - NPO  - Staged PCI of mLAD today  - Continue ASA 81 mg daily, Plavix 75 mg daily, and atorvastatin 40 mg nightly  - Continue carvedilol 6.25 mg BID, amlodipine 5 mg daily (home dose 2.5 mg), and losartan 50 mg daily (home dose 25 mg)  - Will check bilateral BPs  - If SBP> 140, will increase amlodipine to 10 mg daily   - Patient has new Rx for Ambien 10 mg from Dr. Chen (has no recent refills). Will give melatonin 10 mg QHS and Ambien 5 mg QHS. Can take a second dose of Ambien 5 mg PRN if patient has not fallen asleep after 1 hours and has normal mentation.  - A1c 6.1  - LDL 84 on atorvastatin 10 mg   - TSH 2.68  - TTE with LVEF 62%, G1DD, normal RV size and function, mild-moderate AI, mild MR, mild TR  - Outpatient follow-up with me on 5/12
75yo Mandarin-speaking M, but able to communicate in English, with HTN, HLD, pre-diabetes, and history of prostate cancer s/p radiation (2018) who presented to ED with inability to sleep and chest pressure at night. NST demostrated moderate-to-large size reversible defect of the inferolateral wall. [facility] today 4/28/25 with 30% lesion of LM, diffuse calcified 80% lesion mid LAD amendable to PCI, 99% lesion of pLCx and 90% lesion of OM1, and 40% lesion of pRCA. Patient underwent PCI of pLCx/OM1 with MONSERRAT x 1 (SYNERGY 2.75 x 32mm) with plan for staged PCI of mLAD.    Plan:   - Continue ASA 81 mg daily, Plavix 75 mg daily, and atorvastatin 40 mg nightly  - Continue carvedilol 6.25 mg BID, amlodipine 5 mg daily (home dose 2.5 mg), and losartan 50 mg daily (home dose 25 mg)  - If BP remains uncontrolled tomorrow, will increase amlodipine to 10 mg daily   - Patient has new Rx for Ambien 10 mg from Dr. Chen (has no recent refills). Will give melatonin 10 mg QHS and Ambien 5 mg PRN. Can take a second dose of Ambien 5 mg PRN if patient has not fallen asleep after 1 hours and has normal mentation.  - A1c 6.1  - LDL 84 on atorvastatin 10 mg   - TSH 2.68  - TTE with LVEF 62%, G1DD, normal RV size and function, mild-moderate AI, mild MR, mild TR  - Staged PCI of mLAD on 4/30  - NPO at [place]  - Prehydration at 6am

## 2025-04-30 NOTE — PROGRESS NOTE ADULT - SUBJECTIVE AND OBJECTIVE BOX
Chief complaint: Patient is a 74y old  Male who presents with a chief complaint of Chest Pain (28 Apr 2025 14:09)    Interval history: Patient seen and examined at bedside. Sp [facility] yesterday with PCI to pLCX/om1 via r radial, denies any cp. Reports he had 5 hours of good sleep overnight.     Review of systems: A complete 10-point review of systems was obtained and is negative except as stated in the interval history.    Vitals:  T(F): 98.1, Max: 98.1 (04-29 @ 07:32)  HR: 67 (61 - 79)  BP: 123/58 (123/58 - 174/74)  RR: 17 (17 - 20)  SpO2: 98% (95% - 98%)    Ins & outs:     04-26 @ 07:01  -  04-27 @ 07:00  --------------------------------------------------------  IN: 207 mL / OUT: 0 mL / NET: 207 mL    04-28 @ 07:01  -  04-29 @ 07:00  --------------------------------------------------------  IN: 1050 mL / OUT: 0 mL / NET: 1050 mL      Weight trend:  Weight (kg): 63.5 (04-26)    Physical exam:  General: No apparent distress  HEENT: Anicteric sclera. Moist mucous membranes. JVD-   Cardiac: Regular rate and rhythm. No murmurs, rubs, or gallops.   Vascular: Symmetric radial pulses. Dorsalis pedis pulses palpable.   Respiratory: Normal effort. Clear to ascultation.   Abdomen: Soft, nontender. Audible bowel sounds.   Extremities: Warm with no edema. No cyanosis or clubbing.   Skin: Warm and dry. No rash.   Neurologic: Grossly normal motor function.   Psychiatric: Oriented to person, place, and time.     Data reviewed:  - Telemetry: SR 72-83 BPM no events   - ECG NSR 78 bpm, LVH  4/26  - TTE -  LVEF 62 %, Grade I DD, mild MVR, mild TR, sclerotic aortic valve with normal opening, mild to mod aortic regurgitation   - Chest x-ray- 4/26 WNL  - Stress test: 4/26/25- mod to large size reversible reversible defect in the inferolateral wall of the lv  consistent with ischemia.  - Cardiac catheterization- double vessel CAD, sp PCI of 95% pLCX/om1 stenosis DESx1. STAGED TO MLAD tomorrow 4/30.  No recent CCTA, Cardiac MRI     - Labs:                        13.1   7.97  )-----------( 190      ( 29 Apr 2025 05:36 )             38.2     04-28    131[L]  |  100  |  15  ----------------------------<  117[H]  3.7   |  22  |  0.9    Ca    7.9[L]      28 Apr 2025 12:20              Triglycerides, Serum: 92 mg/dL (04-27-25 @ 05:48)  LDL Cholesterol Calculated: 84 mg/dL (04-27-25 @ 05:48)    Thyroid Stimulating Hormone, Serum: 2.68 uIU/mL (04-27-25 @ 05:48)    Urinalysis Basic - ( 28 Apr 2025 12:20 )    Color: x / Appearance: x / SG: x / pH: x  Gluc: 117 mg/dL / Ketone: x  / Bili: x / Urobili: x   Blood: x / Protein: x / Nitrite: x   Leuk Esterase: x / RBC: x / WBC x   Sq Epi: x / Non Sq Epi: x / Bacteria: x        Medications:  amLODIPine   Tablet 5 milliGRAM(s) Oral daily  aspirin enteric coated 81 milliGRAM(s) Oral daily  atorvastatin 40 milliGRAM(s) Oral at bedtime  carvedilol 6.25 milliGRAM(s) Oral every 12 hours  chlorhexidine 2% Cloths 1 Application(s) Topical <User Schedule>  clopidogrel Tablet 75 milliGRAM(s) Oral daily  losartan 50 milliGRAM(s) Oral daily  melatonin 3 milliGRAM(s) Oral at bedtime  melatonin 10 milliGRAM(s) Oral once  regadenoson Injectable 0.4 milliGRAM(s) IV Push once  senna 2 Tablet(s) Oral at bedtime    Drips:    PRN:     Allergies    No Known Allergies    Intolerances    
Chief complaint: Patient is a 74y old  Male who presents with a chief complaint of Chest pain (29 Apr 2025 14:22)    Interval history: Patient seen and examined at bedside. [facility] 4/28 with PCI to pLCX/om1 via r radial, denies any cp. Reports he had good sleep overnight. NPO for staged to mLAD today.     Review of systems: A complete 10-point review of systems was obtained and is negative except as stated in the interval history.    Vitals:  T(F): 97.4, Max: 98.7 (04-30 @ 00:06)  HR: 67 (67 - 96)  BP: 136/62 (136/62 - 175/73)  RR: 18 (18 - 18)  SpO2: 99% (99% - 99%)    Ins & outs:     04-28 @ 07:01  -  04-29 @ 07:00  --------------------------------------------------------  IN: 1050 mL / OUT: 0 mL / NET: 1050 mL    04-29 @ 07:01 - 04-30 @ 07:00  --------------------------------------------------------  IN: 370 mL / OUT: 250 mL / NET: 120 mL    04-30 @ 07:01 - 04-30 @ 09:57  --------------------------------------------------------  IN: 250 mL / OUT: 0 mL / NET: 250 mL      Weight trend:  Weight (kg): 63.5 (04-26)    Physical exam:  General: No apparent distress  HEENT: Anicteric sclera. Moist mucous membranes. JVD-   Cardiac: Regular rate and rhythm. No murmurs, rubs, or gallops.   Vascular: Symmetric radial pulses. Dorsalis pedis pulses palpable.   Respiratory: Normal effort. Clear to ascultation.   Abdomen: Soft, nontender. Audible bowel sounds.   Extremities: Warm with no edema. No cyanosis or clubbing.   Skin: Warm and dry. No rash.   Neurologic: Grossly normal motor function.   Psychiatric: Oriented to person, place, and time.     Data reviewed:  - Telemetry: SR 67-96 BPM no events   - ECG NSR 78 bpm, LVH  4/26  - TTE -  LVEF 62 %, Grade I DD, mild MVR, mild TR, sclerotic aortic valve with normal opening, mild to mod aortic regurgitation   - Chest x-ray- 4/26 WNL  - Stress test: 4/26/25- mod to large size reversible reversible defect in the inferolateral wall of the lv  consistent with ischemia.  - Cardiac catheterization- double vessel CAD, sp PCI of 95% pLCX/om1 stenosis DESx1. STAGED TO MLAD TODAY 4/30.  No recent CCTA, Cardiac MRI     - Labs:                        13.1   7.97  )-----------( 190      ( 29 Apr 2025 05:36 )             38.2     04-28    131[L]  |  100  |  15  ----------------------------<  117[H]  3.7   |  22  |  0.9    Ca    7.9[L]      28 Apr 2025 12:20              Triglycerides, Serum: 92 mg/dL (04-27-25 @ 05:48)  LDL Cholesterol Calculated: 84 mg/dL (04-27-25 @ 05:48)    Thyroid Stimulating Hormone, Serum: 2.68 uIU/mL (04-27-25 @ 05:48)    Urinalysis Basic - ( 28 Apr 2025 12:20 )    Color: x / Appearance: x / SG: x / pH: x  Gluc: 117 mg/dL / Ketone: x  / Bili: x / Urobili: x   Blood: x / Protein: x / Nitrite: x   Leuk Esterase: x / RBC: x / WBC x   Sq Epi: x / Non Sq Epi: x / Bacteria: x        Medications:  amLODIPine   Tablet 5 milliGRAM(s) Oral daily  aspirin enteric coated 81 milliGRAM(s) Oral daily  atorvastatin 40 milliGRAM(s) Oral at bedtime  carvedilol 6.25 milliGRAM(s) Oral every 12 hours  chlorhexidine 2% Cloths 1 Application(s) Topical <User Schedule>  clopidogrel Tablet 75 milliGRAM(s) Oral daily  losartan 50 milliGRAM(s) Oral daily  melatonin 10 milliGRAM(s) Oral at bedtime  melatonin 10 milliGRAM(s) Oral once  senna 2 Tablet(s) Oral at bedtime    Drips:  sodium chloride 0.9%. 500 milliLiter(s) (250 mL/Hr) IV Continuous <Continuous>    PRN:     Allergies    No Known Allergies    Intolerances    
Chief complaint: Patient is a 74y old  Male who presents with a chief complaint of Chest pain    Interval history: Patient seen and examined at bedside. Patient complaining of lack of sleep, and some constipation.    Review of systems: A complete 10-point review of systems was obtained and is negative except as stated in the interval history.    Vitals:  T(F): 97.6, Max: 98.4 (04-26 @ 23:45)  HR: 76 (74 - 92)  BP: 179/78 (144/65 - 194/87)  RR: 16 (12 - 18)  SpO2: 100% (98% - 100%)    Ins & outs:     04-26 @ 07:01  -  04-27 @ 07:00  --------------------------------------------------------  IN: 207 mL / OUT: 0 mL / NET: 207 mL      Weight trend:  Weight (kg): 63.5 (04-26)    Physical exam:  General: No apparent distress  HEENT: Anicteric sclera. Moist mucous membranes. JVD-   Cardiac: Regular rate and rhythm. No murmurs, rubs, or gallops.   Vascular: Symmetric radial pulses. Dorsalis pedis pulses palpable.   Respiratory: Normal effort. Clear to ascultation.   Abdomen: Soft, nontender. Audible bowel sounds.   Extremities: Warm with no edema. No cyanosis or clubbing.   Skin: Warm and dry. No rash.   Neurologic: Grossly normal motor function.   Psychiatric: Oriented to person, place, and time.     Data reviewed:  - Telemetry: SR 82 bpm   - ECG NSR 78 bpm, LVH  4/26  - TTE - pending read   - Chest x-ray- 4/26 WNL  - Stress test: 4/26/25- mod to large size reversible reversible defect in the inferolateral wall of the lv  consistent with ischemia.  - Cardiac catheterization- NPO for [facility] tmrw   No recent CCTA, Cardiac MRI     - Labs:                        15.0   6.02  )-----------( 213      ( 27 Apr 2025 05:48 )             42.4     04-27    131[L]  |  98  |  12  ----------------------------<  116[H]  4.2   |  24  |  0.8    Ca    9.0      27 Apr 2025 05:48  Mg     2.2     04-27    TPro  6.4  /  Alb  4.2  /  TBili  0.7  /  DBili  x   /  AST  29  /  ALT  17  /  AlkPhos  37  04-26            Triglycerides, Serum: 92 mg/dL (04-27-25 @ 05:48)  LDL Cholesterol Calculated: 84 mg/dL (04-27-25 @ 05:48)      Urinalysis Basic - ( 27 Apr 2025 05:48 )    Color: x / Appearance: x / SG: x / pH: x  Gluc: 116 mg/dL / Ketone: x  / Bili: x / Urobili: x   Blood: x / Protein: x / Nitrite: x   Leuk Esterase: x / RBC: x / WBC x   Sq Epi: x / Non Sq Epi: x / Bacteria: x        Medications:  amLODIPine   Tablet 5 milliGRAM(s) Oral daily  aspirin enteric coated 81 milliGRAM(s) Oral daily  atorvastatin 20 milliGRAM(s) Oral at bedtime  heparin   Injectable 5000 Unit(s) SubCutaneous every 12 hours  losartan 50 milliGRAM(s) Oral daily  melatonin 5 milliGRAM(s) Oral at bedtime  regadenoson Injectable 0.4 milliGRAM(s) IV Push once  senna 2 Tablet(s) Oral at bedtime    Drips:    PRN:     Allergies    No Known Allergies    Intolerances  
Chief complaint: Patient is a 74y old  Male who presents with a chief complaint of Chest pain    Interval history: Patient seen and examined at bedside. NAD noted. Patient is NPO for [facility] today. ON refused prehydration fluids in anticipation of frequent urination at night.     Review of systems: A complete 10-point review of systems was obtained and is negative except as stated in the interval history.    Vitals:  Vital Signs Last 24 Hrs  T(C): 36.6 (28 Apr 2025 07:36), Max: 36.7 (27 Apr 2025 22:05)  T(F): 97.9 (28 Apr 2025 07:36), Max: 98 (27 Apr 2025 22:05)  HR: 85 (28 Apr 2025 07:36) (70 - 94)  BP: 119/70 (28 Apr 2025 07:36) (119/70 - 225/87)  BP(mean): 89 (28 Apr 2025 07:36) (89 - 125)  RR: 19 (28 Apr 2025 07:36) (17 - 19)  SpO2: 98% (28 Apr 2025 07:36) (97% - 98%)    Parameters below as of 28 Apr 2025 07:36  Patient On (Oxygen Delivery Method): room air    I&O's Summary      Physical exam:  General: No apparent distress  HEENT: Anicteric sclera. Moist mucous membranes. JVD-   Cardiac: Regular rate and rhythm. No murmurs, rubs, or gallops.   Vascular: Symmetric radial pulses. Dorsalis pedis pulses palpable.   Respiratory: Normal effort. Clear to ascultation.   Abdomen: Soft, nontender. Audible bowel sounds.   Extremities: Warm with no edema. No cyanosis or clubbing.   Skin: Warm and dry. No rash.   Neurologic: Grossly normal motor function.   Psychiatric: Oriented to person, place, and time.     Data reviewed:  - Telemetry: SR 72-83 BPM no events   - ECG NSR 78 bpm, LVH  4/26  - TTE -  LVEF 62 %, Grade I DD, mild MVR, mild TR, sclerotic aortic valve with normal opening, mild to mod aortic regurgitation   - Chest x-ray- 4/26 WNL  - Stress test: 4/26/25- mod to large size reversible reversible defect in the inferolateral wall of the lv  consistent with ischemia.  - Cardiac catheterization- Left Heart Cath today pending results  No recent CCTA, Cardiac MRI     - Labs:                        15.0   6.02  )-----------( 213      ( 27 Apr 2025 05:48 )             42.4     04-27    131[L]  |  98  |  12  ----------------------------<  116[H]  4.2   |  24  |  0.8    Ca    9.0      27 Apr 2025 05:48  Mg     2.2     04-27    Lactate Trend    Urinalysis Basic - ( 27 Apr 2025 05:48 )    Color: x / Appearance: x / SG: x / pH: x  Gluc: 116 mg/dL / Ketone: x  / Bili: x / Urobili: x   Blood: x / Protein: x / Nitrite: x   Leuk Esterase: x / RBC: x / WBC x   Sq Epi: x / Non Sq Epi: x / Bacteria: x    Medications:  MEDICATIONS  (STANDING):  amLODIPine   Tablet 5 milliGRAM(s) Oral daily  aspirin enteric coated 81 milliGRAM(s) Oral daily  atorvastatin 40 milliGRAM(s) Oral at bedtime  chlorhexidine 2% Cloths 1 Application(s) Topical <User Schedule>  clopidogrel Tablet 600 milliGRAM(s) Oral once  losartan 50 milliGRAM(s) Oral daily  melatonin 5 milliGRAM(s) Oral at bedtime  regadenoson Injectable 0.4 milliGRAM(s) IV Push once  senna 2 Tablet(s) Oral at bedtime  sodium chloride 0.9%. 1000 milliLiter(s) (75 mL/Hr) IV Continuous <Continuous>  sodium chloride 0.9%. 1000 milliLiter(s) (75 mL/Hr) IV Continuous <Continuous>    MEDICATIONS  (PRN):  polyethylene glycol 3350 17 Gram(s) Oral daily PRN Constipation  traZODone 50 milliGRAM(s) Oral at bedtime PRN Insomnia

## 2025-04-30 NOTE — CHART NOTE - NSCHARTNOTEFT_GEN_A_CORE
INTERVENTIONAL CARDIOLOGY:                                               PREOPERATIVE DAY OF PROCEDURE EVALUATION:  I have personally seen and examined the patient.  I agree with the history and physical which I have reviewed and noted any changes below.     75 y/o Mandarin speaking M, but able to communicate in English, with PMHx of HTN, HLD, pre-diabetes, h/o prostate Ca -> s/p radiation 2018, who presented to ED with inability to sleep few days, and the past couple of days c/o chest pressure at night, trops negative x 2, with subsequent abnormal NST which revealing moderate-to-large size reversible inferolateral wall ischemia, s/p [facility] 4/28/25 -> PCI/MONSERRAT of pCx with residual LAD dz. TTE 4/27/25: LVEF 62 %, Grade I DD, mild MVR, mild TR, sclerotic aortic valve with normal opening, mild to mod AR.    Pt now presents for recommended staged PCI of LAD.    [facility] 4/28/25:  Intervention:   OCT  MONSERRAT PCI to pLcx  Implants:    SYNERGY 2.75 x 32mm to pLCX (AUC 8)    FINDINGS:     Coronary Dominance: Right dominate    LM: 30% lesion prox LM  LAD: Diffused calcified 80% lesion mid LAD amendable to PCI  CX: 99% lesion Prox LCX s/p PCI, 90% lesion OM1 s/p PCI  RCA: 40% lesion prox RCA    LVEDP: 14 mmHg     Pre cath note:  indication:  [ ] STEMI                [ ] NSTEMI                 [ ] Acute coronary syndrome                   [x ]Unstable Angina   [ ] high risk  [x ] intermediate risk  [ ] low risk                   [ ] Stable Angina     non-invasive testing:   [facility]/for staged PCI           Date:    4/28/25         result: [ ] high risk  [ x] intermediate risk  [ ] low risk    Anti- Anginal medications:                    [ ] not used d/t                     [x ] used   (x ) BB     ( x) CCB      ( ) Nitrate   (  ) Ranexa          [ ] not used but strong indication not to use    Ejection Fraction                   [ ] <29            [ ] 30-39%   [ ] 40-49%     [x ]>50%    CHF          [ ] active (within last 14 days on meds   [ ] Chronic (on meds but no exacerbation)  NYHA Functional Class:  (  ) Class I (no limitations)  (  ) Class II (slight limitation)  (  ) Class III (marked limitation)  (  ) Class IV (symptoms at rest)    COPD                   [ ] mild (on chronic bronchodilators)  [ ] moderate (on chronic steroid therapy)      [ ] severe (indication for home O2 or PACO2 >50)    Other risk factors:                     [ ] Previous MI                     [ ] CVA/ stroke                    [ ] carotid stent/ CEA                    [ ] PVD/PAD- (arterial aneurysm, non-palpable pulses, tortuous vessel with inability to insert catheter, infra-renal dissection, renal or subclavian artery stenosis)                    [ ] previous CABG                    [ ] Renal Failure:  on HD  (  ) yes  (  ) no                    [ ] Diabetic  (  ) Type 1  (  ) Type 2                                         (  ) Insulin dependent  (  ) non-insulin dependent                                         (  ) Metformin  (  ) Januvia  (  ) Glimepiride  (  ) Glipizide  (  ) Glyburide  (  ) Actos                                         (  ) GLP-1 receptor agonists (Ozempic, Wegovy, Zepbound, Mounjaro, Trulicity, Byetta, Victoza)                                         (  ) SGLT2 Inhibitors (Farxiga, Jardiance, Invokana)                                         (  ) Other      Bleeding Risk: 1.0%    Pre-cath Hydration: (  x)  cc IV bolus x 1 over 1 hr followed by:    (x  ) NS @ 75cc/hr until procedure (up to 2 hrs) if EF> 50%      RIGHT RADIAL ARTERY EVALUATION:  REYNA TEST: [] Negative          [x] Positive  mild ecchymosis, site is soft with 2+ radial pulse     VS: 163/57, 56, 16, 98% on RA  ECG 4/29/25: NSR @ bpm, TWI II, III, aVF, V6         (Signed electronically by __________)  04-30-25 @ 12:35

## 2025-04-30 NOTE — PROGRESS NOTE ADULT - ASSESSMENT
75 y/o Mandarin speaking M, but able to communicate in English, with PMHx of HTN, HLD, pre-diabetes, h/o prostate Ca -> s/p radiation 2018, who presented to ED with inability to sleep few days, and the past couple of days c/ochest pressure at night, trops negative x 2, with subsequent abnormal NST which revealing moderate-to-large size reversible inferolateral wall ischemia. Pt is being admitted to [facility] for further management.    #Unstable angina  - trops negative x 2  - NST with inferolateral wall ischemia  - TTE LVEF 62 %, Grade I DD, mild MVR, mild TR, sclerotic aortic valve with normal opening, mild to mod aortic regurgitation   - Cont Aspirin 81mg daily   - Heparin Subq on hold for cath   - increase Amlodipine to 5mg daily (home 2.5mg daily)  - increase Losartan to 50mg daily (home 25mg daily)  - LDL 84, increase Atorvastatin to 40mg daily (home 20mg daily)  - A1C 6.1  - f/u TFTs  - NPO for [facility] today  - consider Coreg if PCI is placed  - refused IVF overnight     #HTN  /87 4/28 ON  - Hydralazine 25mg PO x 1 given  - increase Losartan to 50mg daily (home 25mg daily)  - increase Amlodipine to 5mg daily (home 2.5mg daily)  - consider Coreg if PCI is placed    #Constipation  - Senna HS   - Miralax PRN     #Insomnia   - trazodone 50mg tonight   - Ambien ineffective     Please contact [phone] with any questions or concerns     
Assessment	  75 y/o Mandarin speaking M, but able to communicate in English, with PMHx of HTN, HLD, pre-diabetes, h/o prostate Ca -> s/p radiation 2018, who presented to ED with inability to sleep few days, and the past couple of days c/ochest pressure at night, trops negative x 2, with subsequent abnormal NST which revealing moderate-to-large size reversible inferolateral wall ischemia. Pt is being admitted to [facility] for further management.    #Unstable angina  - trops negative x 2  - NST with inferolateral wall ischemia  - TTE LVEF 62 %, Grade I DD, mild MVR, mild TR, sclerotic aortic valve with normal opening, mild to mod aortic regurgitation   - Cont Aspirin 81mg daily, Cont Plavix 75mg daily    - Heparin Subq on hold for cath   - Cont Amlodipine to 5mg daily (home 2.5mg daily)  - Cont Losartan to 50mg daily (home 25mg daily)  - LDL 84, increase Atorvastatin to 40mg daily (home 20mg daily)  - A1C 6.1  - f/u TFTs  - [facility] 4/28 sp PCI to pLCX/OM1 desx1.   - Cont Coreg 6.25 mg BID   - NPO for Staged PCI to mLAD today  - IVF 4/30 6am 250cc/hourx2 hours ( so patient can get some sleep overnight)    #HTN  /87 4/28 ON  - Hydralazine 25mg PO x 1 given  - Cont Losartan to 50mg daily (home 25mg daily)  - Cont  Amlodipine to 5mg daily (home 2.5mg daily)  - Cont Coreg 6.25 mg BID     #Constipation  - Senna HS   - Miralax PRN     #Insomnia   - Ambien 5 PRN   - Melatonin 10mg HS    Please contact [phone] with any questions or concerns   
· Assessment	  73 y/o Mandarin speaking M, but able to communicate in English, with PMHx of HTN, HLD, pre-diabetes, h/o prostate Ca -> s/p radiation 2018, who presented to ED with inability to sleep few days, and the past couple of days c/ochest pressure at night, trops negative x 2, with subsequent abnormal NST which revealing moderate-to-large size reversible inferolateral wall ischemia. Pt is being admitted to [facility] for further management.    #Unstable angina  - trops negative x 2  - NST with inferolateral wall ischemia  - TTE LVEF 62 %, Grade I DD, mild MVR, mild TR, sclerotic aortic valve with normal opening, mild to mod aortic regurgitation   - Cont Aspirin 81mg daily, Cont Plavix 75mg daily    - Heparin Subq on hold for cath   - increase Amlodipine to 5mg daily (home 2.5mg daily)  - increase Losartan to 50mg daily (home 25mg daily)  - LDL 84, increase Atorvastatin to 40mg daily (home 20mg daily)  - A1C 6.1  - f/u TFTs  - [facility] yesterday 4/28 sp PCI to pLCX/OM1 desx1.   - Cont Coreg 6.25 mg BID   - Staged PCI to mLAD tomorrow,   - NPO after MN  - IVF 4/30 6am 250cc/hourx2 hours ( so patient can get some sleep overnight)    #HTN  /87 4/28 ON  - Hydralazine 25mg PO x 1 given  - Cont Losartan to 50mg daily (home 25mg daily)  - Cont  Amlodipine to 5mg daily (home 2.5mg daily)  - Cont Coreg 6.25 mg BID     #Constipation  - Senna HS   - Miralax PRN     #Insomnia   - Ambien 5 PRN   - Melatonin 3mg HS    Please contact [phone] with any questions or concerns     
73 y/o Mandarin speaking M, but able to communicate in English, with PMHx of HTN, HLD, pre-diabetes, h/o prostate Ca -> s/p radiation 2018, who presented to ED with inability to sleep few days, and the past couple of days c/ochest pressure at night, trops negative x 2, with subsequent abnormal NST which revealing moderate-to-large size reversible inferolateral wall ischemia. Pt is being admitted to [facility] for further management.    #Unstable angina  -trops negative x 2  - NST with inferolateral wall ischemia  -TTE read pend   -Cont Aspirin 81mg daily   - Heparin Subq on hold for cath   -continue home Amlodipine 2.5mg   -continue home Losartan 25mg  -continue home atorvastatin 20mg  -check ldl 84, A1C, TFTs  -NPO for [facility] tomorrow   - IVF tonight     #Constipation  - Senna HS   - Miralax PRN     #Insomnia   - trazodone 50mg tonight   - Ambien ineffective     Please contact [phone] with any questions or concerns

## 2025-05-01 ENCOUNTER — TRANSCRIPTION ENCOUNTER (OUTPATIENT)
Age: 75
End: 2025-05-01

## 2025-05-01 ENCOUNTER — NON-APPOINTMENT (OUTPATIENT)
Age: 75
End: 2025-05-01

## 2025-05-01 VITALS
TEMPERATURE: 98 F | SYSTOLIC BLOOD PRESSURE: 159 MMHG | DIASTOLIC BLOOD PRESSURE: 72 MMHG | HEART RATE: 61 BPM | OXYGEN SATURATION: 98 % | RESPIRATION RATE: 18 BRPM

## 2025-05-01 LAB
HCT VFR BLD CALC: 38 % — LOW (ref 42–52)
HGB BLD-MCNC: 13.2 G/DL — LOW (ref 14–18)
MCHC RBC-ENTMCNC: 32.1 PG — HIGH (ref 27–31)
MCHC RBC-ENTMCNC: 34.7 G/DL — SIGNIFICANT CHANGE UP (ref 32–37)
MCV RBC AUTO: 92.5 FL — SIGNIFICANT CHANGE UP (ref 80–94)
NRBC BLD AUTO-RTO: 0 /100 WBCS — SIGNIFICANT CHANGE UP (ref 0–0)
PLATELET # BLD AUTO: 166 K/UL — SIGNIFICANT CHANGE UP (ref 130–400)
PMV BLD: 9.5 FL — SIGNIFICANT CHANGE UP (ref 7.4–10.4)
RBC # BLD: 4.11 M/UL — LOW (ref 4.7–6.1)
RBC # FLD: 12.8 % — SIGNIFICANT CHANGE UP (ref 11.5–14.5)
WBC # BLD: 6.96 K/UL — SIGNIFICANT CHANGE UP (ref 4.8–10.8)
WBC # FLD AUTO: 6.96 K/UL — SIGNIFICANT CHANGE UP (ref 4.8–10.8)

## 2025-05-01 PROCEDURE — 93010 ELECTROCARDIOGRAM REPORT: CPT

## 2025-05-01 PROCEDURE — 99239 HOSP IP/OBS DSCHRG MGMT >30: CPT

## 2025-05-01 RX ORDER — AMLODIPINE BESYLATE 10 MG/1
1 TABLET ORAL
Qty: 30 | Refills: 0
Start: 2025-05-01 | End: 2025-05-30

## 2025-05-01 RX ORDER — LOSARTAN POTASSIUM 100 MG/1
1 TABLET, FILM COATED ORAL
Refills: 0 | DISCHARGE

## 2025-05-01 RX ORDER — ATORVASTATIN CALCIUM 80 MG/1
1 TABLET, FILM COATED ORAL
Refills: 0 | DISCHARGE

## 2025-05-01 RX ORDER — ATORVASTATIN CALCIUM 80 MG/1
1 TABLET, FILM COATED ORAL
Qty: 30 | Refills: 0
Start: 2025-05-01 | End: 2025-05-30

## 2025-05-01 RX ORDER — LOSARTAN POTASSIUM 100 MG/1
1 TABLET, FILM COATED ORAL
Qty: 30 | Refills: 0
Start: 2025-05-01 | End: 2025-05-30

## 2025-05-01 RX ORDER — AMLODIPINE BESYLATE 10 MG/1
1 TABLET ORAL
Refills: 0 | DISCHARGE

## 2025-05-01 RX ORDER — CARVEDILOL 3.12 MG/1
1 TABLET, FILM COATED ORAL
Qty: 60 | Refills: 0
Start: 2025-05-01 | End: 2025-05-30

## 2025-05-01 RX ORDER — ASPIRIN 325 MG
1 TABLET ORAL
Qty: 30 | Refills: 0
Start: 2025-05-01 | End: 2025-05-30

## 2025-05-01 RX ORDER — CLOPIDOGREL BISULFATE 75 MG/1
1 TABLET, FILM COATED ORAL
Qty: 30 | Refills: 0
Start: 2025-05-01 | End: 2025-05-30

## 2025-05-01 RX ADMIN — Medication 81 MILLIGRAM(S): at 12:54

## 2025-05-01 RX ADMIN — CLOPIDOGREL BISULFATE 75 MILLIGRAM(S): 75 TABLET, FILM COATED ORAL at 12:54

## 2025-05-01 RX ADMIN — CARVEDILOL 6.25 MILLIGRAM(S): 3.12 TABLET, FILM COATED ORAL at 05:48

## 2025-05-01 RX ADMIN — AMLODIPINE BESYLATE 5 MILLIGRAM(S): 10 TABLET ORAL at 05:48

## 2025-05-01 RX ADMIN — LOSARTAN POTASSIUM 50 MILLIGRAM(S): 100 TABLET, FILM COATED ORAL at 05:48

## 2025-05-01 RX ADMIN — Medication 1 APPLICATION(S): at 05:49

## 2025-05-01 NOTE — DISCHARGE NOTE NURSING/CASE MANAGEMENT/SOCIAL WORK - FINANCIAL ASSISTANCE
[facility] provides services at a reduced cost to those who are determined to be eligible through [facility]’s financial assistance program. Information regarding [facility]’s financial assistance program can be found by going to [email or web address] or by calling [phone].

## 2025-05-01 NOTE — DISCHARGE NOTE NURSING/CASE MANAGEMENT/SOCIAL WORK - PATIENT PORTAL LINK FT
You can access the FollowMyHealth Patient Portal offered by [facility] by registering at the following website: [email or web address]. By joining [facility]’s FollowMyHealth portal, you will also be able to view your health information using other applications (apps) compatible with our system.

## 2025-05-02 ENCOUNTER — NON-APPOINTMENT (OUTPATIENT)
Age: 75
End: 2025-05-02

## 2025-05-08 RX ORDER — ICOSAPENT ETHYL 1 G/1
1 CAPSULE ORAL
Refills: 0 | Status: ACTIVE | COMMUNITY

## 2025-05-12 ENCOUNTER — APPOINTMENT (OUTPATIENT)
Dept: CARDIOLOGY | Facility: CLINIC | Age: 75
End: 2025-05-12
Payer: MEDICARE

## 2025-05-12 VITALS
SYSTOLIC BLOOD PRESSURE: 134 MMHG | HEART RATE: 58 BPM | HEIGHT: 64 IN | WEIGHT: 137 LBS | DIASTOLIC BLOOD PRESSURE: 70 MMHG | OXYGEN SATURATION: 99 % | BODY MASS INDEX: 23.39 KG/M2

## 2025-05-12 DIAGNOSIS — I25.10 ATHEROSCLEROTIC HEART DISEASE OF NATIVE CORONARY ARTERY W/OUT ANGINA PECTORIS: ICD-10-CM

## 2025-05-12 PROBLEM — I10 ESSENTIAL (PRIMARY) HYPERTENSION: Chronic | Status: ACTIVE | Noted: 2025-04-26

## 2025-05-12 PROBLEM — E78.5 HYPERLIPIDEMIA, UNSPECIFIED: Chronic | Status: ACTIVE | Noted: 2025-04-26

## 2025-05-12 PROCEDURE — 93000 ELECTROCARDIOGRAM COMPLETE: CPT

## 2025-05-12 PROCEDURE — 99214 OFFICE O/P EST MOD 30 MIN: CPT

## 2025-05-12 RX ORDER — ASPIRIN 81 MG
81 TABLET, DELAYED RELEASE (ENTERIC COATED) ORAL DAILY
Refills: 0 | Status: DISCONTINUED | COMMUNITY
End: 2025-05-12

## 2025-05-12 RX ORDER — ZOLPIDEM TARTRATE 5 MG/1
5 TABLET, FILM COATED ORAL
Refills: 0 | Status: ACTIVE | COMMUNITY

## 2025-05-12 RX ORDER — ATORVASTATIN CALCIUM 40 MG/1
40 TABLET, FILM COATED ORAL
Qty: 90 | Refills: 3 | Status: ACTIVE | COMMUNITY
Start: 1900-01-01 | End: 1900-01-01

## 2025-05-12 RX ORDER — LOSARTAN POTASSIUM 50 MG/1
50 TABLET, FILM COATED ORAL DAILY
Qty: 90 | Refills: 3 | Status: ACTIVE | COMMUNITY
Start: 1900-01-01 | End: 1900-01-01

## 2025-05-12 RX ORDER — CLOPIDOGREL BISULFATE 75 MG/1
75 TABLET, FILM COATED ORAL DAILY
Qty: 90 | Refills: 3 | Status: ACTIVE | COMMUNITY
Start: 1900-01-01 | End: 1900-01-01

## 2025-05-12 RX ORDER — CARVEDILOL 6.25 MG/1
6.25 TABLET, FILM COATED ORAL TWICE DAILY
Refills: 0 | Status: DISCONTINUED | COMMUNITY
End: 2025-05-12

## 2025-05-12 RX ORDER — AMLODIPINE BESYLATE 10 MG/1
10 TABLET ORAL DAILY
Qty: 90 | Refills: 3 | Status: ACTIVE | COMMUNITY
Start: 1900-01-01 | End: 1900-01-01

## 2025-05-13 RX ORDER — ASPIRIN 81 MG/1
81 TABLET, DELAYED RELEASE ORAL DAILY
Qty: 90 | Refills: 3 | Status: ACTIVE | COMMUNITY
Start: 2025-05-12 | End: 1900-01-01

## 2025-05-15 DIAGNOSIS — Z79.82 LONG TERM (CURRENT) USE OF ASPIRIN: ICD-10-CM

## 2025-05-15 DIAGNOSIS — I10 ESSENTIAL (PRIMARY) HYPERTENSION: ICD-10-CM

## 2025-05-15 DIAGNOSIS — K59.00 CONSTIPATION, UNSPECIFIED: ICD-10-CM

## 2025-05-15 DIAGNOSIS — I35.1 NONRHEUMATIC AORTIC (VALVE) INSUFFICIENCY: ICD-10-CM

## 2025-05-15 DIAGNOSIS — Z95.5 PRESENCE OF CORONARY ANGIOPLASTY IMPLANT AND GRAFT: ICD-10-CM

## 2025-05-15 DIAGNOSIS — G47.00 INSOMNIA, UNSPECIFIED: ICD-10-CM

## 2025-05-15 DIAGNOSIS — E87.1 HYPO-OSMOLALITY AND HYPONATREMIA: ICD-10-CM

## 2025-05-15 DIAGNOSIS — I25.110 ATHEROSCLEROTIC HEART DISEASE OF NATIVE CORONARY ARTERY WITH UNSTABLE ANGINA PECTORIS: ICD-10-CM

## 2025-05-15 DIAGNOSIS — R73.03 PREDIABETES: ICD-10-CM

## 2025-05-15 DIAGNOSIS — E78.5 HYPERLIPIDEMIA, UNSPECIFIED: ICD-10-CM

## 2025-05-15 DIAGNOSIS — Z92.3 PERSONAL HISTORY OF IRRADIATION: ICD-10-CM

## 2025-05-15 DIAGNOSIS — Z85.46 PERSONAL HISTORY OF MALIGNANT NEOPLASM OF PROSTATE: ICD-10-CM

## 2025-06-16 ENCOUNTER — NON-APPOINTMENT (OUTPATIENT)
Age: 75
End: 2025-06-16

## 2025-06-17 ENCOUNTER — TRANSCRIPTION ENCOUNTER (OUTPATIENT)
Age: 75
End: 2025-06-17

## 2025-06-26 ENCOUNTER — TRANSCRIPTION ENCOUNTER (OUTPATIENT)
Age: 75
End: 2025-06-26

## 2025-06-30 ENCOUNTER — TRANSCRIPTION ENCOUNTER (OUTPATIENT)
Age: 75
End: 2025-06-30

## 2025-08-13 ENCOUNTER — APPOINTMENT (OUTPATIENT)
Dept: CARDIOLOGY | Facility: CLINIC | Age: 75
End: 2025-08-13
Payer: MEDICARE

## 2025-08-13 VITALS
BODY MASS INDEX: 24.41 KG/M2 | WEIGHT: 143 LBS | HEIGHT: 64 IN | SYSTOLIC BLOOD PRESSURE: 160 MMHG | DIASTOLIC BLOOD PRESSURE: 66 MMHG | HEART RATE: 74 BPM | OXYGEN SATURATION: 98 %

## 2025-08-13 PROCEDURE — 93000 ELECTROCARDIOGRAM COMPLETE: CPT

## 2025-08-13 PROCEDURE — 99214 OFFICE O/P EST MOD 30 MIN: CPT

## 2025-08-13 RX ORDER — EZETIMIBE 10 MG/1
10 TABLET ORAL DAILY
Qty: 90 | Refills: 3 | Status: ACTIVE | COMMUNITY
Start: 2025-08-13 | End: 1900-01-01

## 2025-09-03 ENCOUNTER — APPOINTMENT (OUTPATIENT)
Dept: UROLOGY | Facility: CLINIC | Age: 75
End: 2025-09-03
Payer: MEDICARE

## 2025-09-03 ENCOUNTER — NON-APPOINTMENT (OUTPATIENT)
Age: 75
End: 2025-09-03

## 2025-09-03 VITALS
RESPIRATION RATE: 18 BRPM | DIASTOLIC BLOOD PRESSURE: 69 MMHG | WEIGHT: 143 LBS | TEMPERATURE: 98 F | HEART RATE: 92 BPM | BODY MASS INDEX: 24.41 KG/M2 | HEIGHT: 64 IN | OXYGEN SATURATION: 96 % | SYSTOLIC BLOOD PRESSURE: 171 MMHG

## 2025-09-03 DIAGNOSIS — R35.0 FREQUENCY OF MICTURITION: ICD-10-CM

## 2025-09-03 DIAGNOSIS — R35.1 NOCTURIA: ICD-10-CM

## 2025-09-03 DIAGNOSIS — C61 MALIGNANT NEOPLASM OF PROSTATE: ICD-10-CM

## 2025-09-03 PROCEDURE — 99214 OFFICE O/P EST MOD 30 MIN: CPT

## 2025-09-03 PROCEDURE — 51798 US URINE CAPACITY MEASURE: CPT

## 2025-09-03 PROCEDURE — G2211 COMPLEX E/M VISIT ADD ON: CPT

## 2025-09-03 RX ORDER — TAMSULOSIN HYDROCHLORIDE 0.4 MG/1
0.4 CAPSULE ORAL
Qty: 90 | Refills: 3 | Status: ACTIVE | COMMUNITY
Start: 2025-09-03 | End: 1900-01-01